# Patient Record
Sex: MALE | Race: WHITE | Employment: FULL TIME | URBAN - METROPOLITAN AREA
[De-identification: names, ages, dates, MRNs, and addresses within clinical notes are randomized per-mention and may not be internally consistent; named-entity substitution may affect disease eponyms.]

---

## 2017-05-10 ENCOUNTER — EMPLOYEE WELLNESS (OUTPATIENT)
Dept: OTHER | Age: 62
End: 2017-05-10

## 2017-05-10 LAB
CHOLESTEROL, TOTAL: 227 MG/DL (ref 0–199)
GLUCOSE BLD-MCNC: 104 MG/DL (ref 74–107)
HDLC SERPL-MCNC: 42 MG/DL
LDL CHOLESTEROL CALCULATED: 159 MG/DL (ref 0–99)
TRIGL SERPL-MCNC: 131 MG/DL (ref 0–149)

## 2018-03-20 VITALS — BODY MASS INDEX: 24.95 KG/M2 | WEIGHT: 184 LBS

## 2018-04-19 ENCOUNTER — EMPLOYEE WELLNESS (OUTPATIENT)
Dept: OTHER | Age: 63
End: 2018-04-19

## 2018-04-19 LAB
CHOLESTEROL, TOTAL: 212 MG/DL (ref 0–199)
GLUCOSE BLD-MCNC: 111 MG/DL (ref 74–107)
HDLC SERPL-MCNC: 47 MG/DL
LDL CHOLESTEROL CALCULATED: 136 MG/DL (ref 0–99)
TRIGL SERPL-MCNC: 143 MG/DL (ref 0–149)

## 2018-04-23 VITALS — WEIGHT: 183 LBS | BODY MASS INDEX: 22.28 KG/M2

## 2018-06-01 ENCOUNTER — HOSPITAL ENCOUNTER (OUTPATIENT)
Dept: INFUSION THERAPY | Age: 63
Discharge: HOME | End: 2018-06-01
Payer: COMMERCIAL

## 2018-06-01 DIAGNOSIS — C18.0 CANCER OF CECUM (HCC): ICD-10-CM

## 2018-06-01 LAB
ALBUMIN SERPL-MCNC: 4.5 G/DL (ref 3.5–5.2)
ALP BLD-CCNC: 65 U/L (ref 40–129)
ALT SERPL-CCNC: 25 U/L (ref 0–40)
ANION GAP SERPL CALCULATED.3IONS-SCNC: 14 MMOL/L (ref 7–16)
AST SERPL-CCNC: 29 U/L (ref 0–39)
BASOPHILS ABSOLUTE: 0.03 E9/L (ref 0–0.2)
BASOPHILS RELATIVE PERCENT: 0.4 % (ref 0–2)
BILIRUB SERPL-MCNC: 0.5 MG/DL (ref 0–1.2)
BUN BLDV-MCNC: 9 MG/DL (ref 8–23)
CALCIUM SERPL-MCNC: 9.1 MG/DL (ref 8.6–10.2)
CEA: 4 NG/ML (ref 0–5.2)
CHLORIDE BLD-SCNC: 102 MMOL/L (ref 98–107)
CO2: 23 MMOL/L (ref 22–29)
CREAT SERPL-MCNC: 0.6 MG/DL (ref 0.7–1.2)
EOSINOPHILS ABSOLUTE: 0.17 E9/L (ref 0.05–0.5)
EOSINOPHILS RELATIVE PERCENT: 2.5 % (ref 0–6)
GFR AFRICAN AMERICAN: >60
GFR NON-AFRICAN AMERICAN: >60 ML/MIN/1.73
GLUCOSE BLD-MCNC: 115 MG/DL (ref 74–109)
HCT VFR BLD CALC: 43.2 % (ref 37–54)
HEMOGLOBIN: 14.3 G/DL (ref 12.5–16.5)
IMMATURE GRANULOCYTES #: 0.03 E9/L
IMMATURE GRANULOCYTES %: 0.4 % (ref 0–5)
LYMPHOCYTES ABSOLUTE: 2.15 E9/L (ref 1.5–4)
LYMPHOCYTES RELATIVE PERCENT: 31.9 % (ref 20–42)
MCH RBC QN AUTO: 30.8 PG (ref 26–35)
MCHC RBC AUTO-ENTMCNC: 33.1 % (ref 32–34.5)
MCV RBC AUTO: 92.9 FL (ref 80–99.9)
MONOCYTES ABSOLUTE: 0.57 E9/L (ref 0.1–0.95)
MONOCYTES RELATIVE PERCENT: 8.5 % (ref 2–12)
NEUTROPHILS ABSOLUTE: 3.79 E9/L (ref 1.8–7.3)
NEUTROPHILS RELATIVE PERCENT: 56.3 % (ref 43–80)
PDW BLD-RTO: 12.6 FL (ref 11.5–15)
PLATELET # BLD: 232 E9/L (ref 130–450)
PMV BLD AUTO: 9.2 FL (ref 7–12)
POTASSIUM SERPL-SCNC: 4.5 MMOL/L (ref 3.5–5)
RBC # BLD: 4.65 E12/L (ref 3.8–5.8)
SODIUM BLD-SCNC: 139 MMOL/L (ref 132–146)
TOTAL PROTEIN: 7.3 G/DL (ref 6.4–8.3)
WBC # BLD: 6.7 E9/L (ref 4.5–11.5)

## 2018-06-01 PROCEDURE — 85025 COMPLETE CBC W/AUTO DIFF WBC: CPT

## 2018-06-01 PROCEDURE — 82378 CARCINOEMBRYONIC ANTIGEN: CPT

## 2018-06-01 PROCEDURE — 36415 COLL VENOUS BLD VENIPUNCTURE: CPT

## 2018-06-01 PROCEDURE — 80053 COMPREHEN METABOLIC PANEL: CPT

## 2018-06-14 ENCOUNTER — HOSPITAL ENCOUNTER (OUTPATIENT)
Dept: CT IMAGING | Age: 63
Discharge: HOME | End: 2018-06-14
Payer: COMMERCIAL

## 2018-06-14 DIAGNOSIS — C18.0 CANCER OF CECUM (HCC): ICD-10-CM

## 2018-06-14 PROCEDURE — 74178 CT ABD&PLV WO CNTR FLWD CNTR: CPT

## 2018-06-14 PROCEDURE — 6360000004 HC RX CONTRAST MEDICATION: Performed by: RADIOLOGY

## 2018-06-14 PROCEDURE — 74177 CT ABD & PELVIS W/CONTRAST: CPT

## 2018-06-14 RX ADMIN — IOPAMIDOL 80 ML: 755 INJECTION, SOLUTION INTRAVENOUS at 10:14

## 2018-06-14 RX ADMIN — IOHEXOL 50 ML: 240 INJECTION, SOLUTION INTRATHECAL; INTRAVASCULAR; INTRAVENOUS; ORAL at 10:13

## 2018-06-18 ENCOUNTER — HOSPITAL ENCOUNTER (OUTPATIENT)
Dept: INFUSION THERAPY | Age: 63
Discharge: HOME | End: 2018-06-18
Payer: COMMERCIAL

## 2018-06-18 ENCOUNTER — OFFICE VISIT (OUTPATIENT)
Dept: ONCOLOGY | Age: 63
End: 2018-06-18
Payer: COMMERCIAL

## 2018-06-18 VITALS
TEMPERATURE: 97.6 F | DIASTOLIC BLOOD PRESSURE: 76 MMHG | SYSTOLIC BLOOD PRESSURE: 120 MMHG | WEIGHT: 184.63 LBS | HEIGHT: 76 IN | RESPIRATION RATE: 20 BRPM | HEART RATE: 95 BPM | BODY MASS INDEX: 22.48 KG/M2

## 2018-06-18 DIAGNOSIS — C18.0 CANCER OF CECUM (HCC): Primary | ICD-10-CM

## 2018-06-18 PROCEDURE — 99212 OFFICE O/P EST SF 10 MIN: CPT

## 2018-12-17 ENCOUNTER — OFFICE VISIT (OUTPATIENT)
Dept: ONCOLOGY | Age: 63
End: 2018-12-17
Payer: COMMERCIAL

## 2018-12-17 ENCOUNTER — HOSPITAL ENCOUNTER (OUTPATIENT)
Dept: INFUSION THERAPY | Age: 63
Discharge: HOME | End: 2018-12-17
Payer: COMMERCIAL

## 2018-12-17 VITALS
HEART RATE: 75 BPM | SYSTOLIC BLOOD PRESSURE: 109 MMHG | DIASTOLIC BLOOD PRESSURE: 79 MMHG | TEMPERATURE: 98.3 F | HEIGHT: 76 IN | BODY MASS INDEX: 23.67 KG/M2 | WEIGHT: 194.4 LBS | RESPIRATION RATE: 20 BRPM

## 2018-12-17 DIAGNOSIS — C18.0 CANCER OF CECUM (HCC): Primary | ICD-10-CM

## 2018-12-17 DIAGNOSIS — C18.0 CARCINOMA OF CECUM (HCC): ICD-10-CM

## 2018-12-17 DIAGNOSIS — C18.0 CANCER OF CECUM (HCC): ICD-10-CM

## 2018-12-17 LAB
ALBUMIN SERPL-MCNC: 4.3 G/DL (ref 3.5–5.2)
ALP BLD-CCNC: 68 U/L (ref 40–129)
ALT SERPL-CCNC: 26 U/L (ref 0–40)
ANION GAP SERPL CALCULATED.3IONS-SCNC: 11 MMOL/L (ref 7–16)
AST SERPL-CCNC: 32 U/L (ref 0–39)
BASOPHILS ABSOLUTE: 0.05 E9/L (ref 0–0.2)
BASOPHILS RELATIVE PERCENT: 0.8 % (ref 0–2)
BILIRUB SERPL-MCNC: 0.3 MG/DL (ref 0–1.2)
BUN BLDV-MCNC: 10 MG/DL (ref 8–23)
CALCIUM SERPL-MCNC: 8.9 MG/DL (ref 8.6–10.2)
CEA: 4.3 NG/ML (ref 0–5.2)
CHLORIDE BLD-SCNC: 102 MMOL/L (ref 98–107)
CO2: 23 MMOL/L (ref 22–29)
CREAT SERPL-MCNC: 0.6 MG/DL (ref 0.7–1.2)
EOSINOPHILS ABSOLUTE: 0.21 E9/L (ref 0.05–0.5)
EOSINOPHILS RELATIVE PERCENT: 3.5 % (ref 0–6)
GFR AFRICAN AMERICAN: >60
GFR NON-AFRICAN AMERICAN: >60 ML/MIN/1.73
GLUCOSE BLD-MCNC: 114 MG/DL (ref 74–99)
HCT VFR BLD CALC: 43.9 % (ref 37–54)
HEMOGLOBIN: 14.7 G/DL (ref 12.5–16.5)
IMMATURE GRANULOCYTES #: 0.01 E9/L
IMMATURE GRANULOCYTES %: 0.2 % (ref 0–5)
LYMPHOCYTES ABSOLUTE: 1.88 E9/L (ref 1.5–4)
LYMPHOCYTES RELATIVE PERCENT: 31.3 % (ref 20–42)
MCH RBC QN AUTO: 30.5 PG (ref 26–35)
MCHC RBC AUTO-ENTMCNC: 33.5 % (ref 32–34.5)
MCV RBC AUTO: 91.1 FL (ref 80–99.9)
MONOCYTES ABSOLUTE: 0.5 E9/L (ref 0.1–0.95)
MONOCYTES RELATIVE PERCENT: 8.3 % (ref 2–12)
NEUTROPHILS ABSOLUTE: 3.35 E9/L (ref 1.8–7.3)
NEUTROPHILS RELATIVE PERCENT: 55.9 % (ref 43–80)
PDW BLD-RTO: 12.6 FL (ref 11.5–15)
PLATELET # BLD: 183 E9/L (ref 130–450)
PMV BLD AUTO: 9 FL (ref 7–12)
POTASSIUM SERPL-SCNC: 4.6 MMOL/L (ref 3.5–5)
RBC # BLD: 4.82 E12/L (ref 3.8–5.8)
SODIUM BLD-SCNC: 136 MMOL/L (ref 132–146)
TOTAL PROTEIN: 7.2 G/DL (ref 6.4–8.3)
WBC # BLD: 6 E9/L (ref 4.5–11.5)

## 2018-12-17 PROCEDURE — 85025 COMPLETE CBC W/AUTO DIFF WBC: CPT

## 2018-12-17 PROCEDURE — 99212 OFFICE O/P EST SF 10 MIN: CPT

## 2018-12-17 PROCEDURE — 82378 CARCINOEMBRYONIC ANTIGEN: CPT

## 2018-12-17 PROCEDURE — 36415 COLL VENOUS BLD VENIPUNCTURE: CPT

## 2018-12-17 PROCEDURE — 80053 COMPREHEN METABOLIC PANEL: CPT

## 2019-04-16 ENCOUNTER — EMPLOYEE WELLNESS (OUTPATIENT)
Dept: OTHER | Age: 64
End: 2019-04-16

## 2019-04-16 LAB
CHOLESTEROL, TOTAL: 191 MG/DL (ref 0–199)
GLUCOSE BLD-MCNC: 106 MG/DL (ref 74–107)
HDLC SERPL-MCNC: 41 MG/DL
LDL CHOLESTEROL CALCULATED: 118 MG/DL (ref 0–99)
TRIGL SERPL-MCNC: 161 MG/DL (ref 0–149)

## 2019-04-23 VITALS — WEIGHT: 184 LBS | BODY MASS INDEX: 22.4 KG/M2

## 2019-07-03 ENCOUNTER — OFFICE VISIT (OUTPATIENT)
Dept: ONCOLOGY | Age: 64
End: 2019-07-03
Payer: COMMERCIAL

## 2019-07-03 ENCOUNTER — HOSPITAL ENCOUNTER (OUTPATIENT)
Dept: INFUSION THERAPY | Age: 64
Discharge: HOME | End: 2019-07-03
Payer: COMMERCIAL

## 2019-07-03 VITALS
HEIGHT: 76 IN | TEMPERATURE: 98.5 F | DIASTOLIC BLOOD PRESSURE: 75 MMHG | SYSTOLIC BLOOD PRESSURE: 120 MMHG | HEART RATE: 83 BPM | RESPIRATION RATE: 20 BRPM | WEIGHT: 183.4 LBS | BODY MASS INDEX: 22.33 KG/M2

## 2019-07-03 DIAGNOSIS — C18.0 CARCINOMA OF CECUM (HCC): ICD-10-CM

## 2019-07-03 DIAGNOSIS — C18.9 COLON CANCER METASTASIZED TO INTRA-ABDOMINAL LYMPH NODE (HCC): Primary | ICD-10-CM

## 2019-07-03 DIAGNOSIS — C77.2 COLON CANCER METASTASIZED TO INTRA-ABDOMINAL LYMPH NODE (HCC): Primary | ICD-10-CM

## 2019-07-03 LAB
ALBUMIN SERPL-MCNC: 4.4 G/DL (ref 3.5–5.2)
ALP BLD-CCNC: 56 U/L (ref 40–129)
ALT SERPL-CCNC: 19 U/L (ref 0–40)
ANION GAP SERPL CALCULATED.3IONS-SCNC: 11 MMOL/L (ref 7–16)
AST SERPL-CCNC: 22 U/L (ref 0–39)
BASOPHILS ABSOLUTE: 0.03 E9/L (ref 0–0.2)
BASOPHILS RELATIVE PERCENT: 0.6 % (ref 0–2)
BILIRUB SERPL-MCNC: 0.5 MG/DL (ref 0–1.2)
BUN BLDV-MCNC: 10 MG/DL (ref 8–23)
CALCIUM SERPL-MCNC: 8.9 MG/DL (ref 8.6–10.2)
CEA: 2.9 NG/ML (ref 0–5.2)
CHLORIDE BLD-SCNC: 106 MMOL/L (ref 98–107)
CO2: 24 MMOL/L (ref 22–29)
CREAT SERPL-MCNC: 0.7 MG/DL (ref 0.7–1.2)
EOSINOPHILS ABSOLUTE: 0.09 E9/L (ref 0.05–0.5)
EOSINOPHILS RELATIVE PERCENT: 1.8 % (ref 0–6)
GFR AFRICAN AMERICAN: >60
GFR NON-AFRICAN AMERICAN: >60 ML/MIN/1.73
GLUCOSE BLD-MCNC: 145 MG/DL (ref 74–99)
HCT VFR BLD CALC: 41 % (ref 37–54)
HEMOGLOBIN: 13.7 G/DL (ref 12.5–16.5)
IMMATURE GRANULOCYTES #: 0.02 E9/L
IMMATURE GRANULOCYTES %: 0.4 % (ref 0–5)
LYMPHOCYTES ABSOLUTE: 1.43 E9/L (ref 1.5–4)
LYMPHOCYTES RELATIVE PERCENT: 28.3 % (ref 20–42)
MCH RBC QN AUTO: 31.1 PG (ref 26–35)
MCHC RBC AUTO-ENTMCNC: 33.4 % (ref 32–34.5)
MCV RBC AUTO: 93 FL (ref 80–99.9)
MONOCYTES ABSOLUTE: 0.47 E9/L (ref 0.1–0.95)
MONOCYTES RELATIVE PERCENT: 9.3 % (ref 2–12)
NEUTROPHILS ABSOLUTE: 3.01 E9/L (ref 1.8–7.3)
NEUTROPHILS RELATIVE PERCENT: 59.6 % (ref 43–80)
PDW BLD-RTO: 12.6 FL (ref 11.5–15)
PLATELET # BLD: 186 E9/L (ref 130–450)
PMV BLD AUTO: 9.4 FL (ref 7–12)
POTASSIUM SERPL-SCNC: 3.9 MMOL/L (ref 3.5–5)
RBC # BLD: 4.41 E12/L (ref 3.8–5.8)
SODIUM BLD-SCNC: 141 MMOL/L (ref 132–146)
TOTAL PROTEIN: 6.9 G/DL (ref 6.4–8.3)
WBC # BLD: 5.1 E9/L (ref 4.5–11.5)

## 2019-07-03 PROCEDURE — 85025 COMPLETE CBC W/AUTO DIFF WBC: CPT

## 2019-07-03 PROCEDURE — 80053 COMPREHEN METABOLIC PANEL: CPT

## 2019-07-03 PROCEDURE — 99212 OFFICE O/P EST SF 10 MIN: CPT

## 2019-07-03 PROCEDURE — 36415 COLL VENOUS BLD VENIPUNCTURE: CPT

## 2019-07-03 PROCEDURE — 82378 CARCINOEMBRYONIC ANTIGEN: CPT

## 2020-02-26 ENCOUNTER — HOSPITAL ENCOUNTER (OUTPATIENT)
Dept: INFUSION THERAPY | Age: 65
Discharge: HOME | End: 2020-02-26
Payer: COMMERCIAL

## 2020-02-26 ENCOUNTER — OFFICE VISIT (OUTPATIENT)
Dept: ONCOLOGY | Age: 65
End: 2020-02-26
Payer: COMMERCIAL

## 2020-02-26 VITALS
HEIGHT: 76 IN | DIASTOLIC BLOOD PRESSURE: 68 MMHG | OXYGEN SATURATION: 96 % | TEMPERATURE: 97.5 F | SYSTOLIC BLOOD PRESSURE: 131 MMHG | WEIGHT: 185.5 LBS | HEART RATE: 77 BPM | BODY MASS INDEX: 22.59 KG/M2

## 2020-02-26 DIAGNOSIS — C77.2 COLON CANCER METASTASIZED TO INTRA-ABDOMINAL LYMPH NODE (HCC): ICD-10-CM

## 2020-02-26 DIAGNOSIS — C18.9 COLON CANCER METASTASIZED TO INTRA-ABDOMINAL LYMPH NODE (HCC): ICD-10-CM

## 2020-02-26 LAB
ALBUMIN SERPL-MCNC: 4.5 G/DL (ref 3.5–5.2)
ALP BLD-CCNC: 72 U/L (ref 40–129)
ALT SERPL-CCNC: 17 U/L (ref 0–40)
ANION GAP SERPL CALCULATED.3IONS-SCNC: 14 MMOL/L (ref 7–16)
AST SERPL-CCNC: 23 U/L (ref 0–39)
BASOPHILS ABSOLUTE: 0.03 E9/L (ref 0–0.2)
BASOPHILS RELATIVE PERCENT: 0.5 % (ref 0–2)
BILIRUB SERPL-MCNC: 0.8 MG/DL (ref 0–1.2)
BUN BLDV-MCNC: 12 MG/DL (ref 8–23)
CALCIUM SERPL-MCNC: 9.5 MG/DL (ref 8.6–10.2)
CEA: 3.4 NG/ML (ref 0–5.2)
CHLORIDE BLD-SCNC: 100 MMOL/L (ref 98–107)
CO2: 24 MMOL/L (ref 22–29)
CREAT SERPL-MCNC: 0.7 MG/DL (ref 0.7–1.2)
EOSINOPHILS ABSOLUTE: 0.11 E9/L (ref 0.05–0.5)
EOSINOPHILS RELATIVE PERCENT: 2 % (ref 0–6)
GFR AFRICAN AMERICAN: >60
GFR NON-AFRICAN AMERICAN: >60 ML/MIN/1.73
GLUCOSE BLD-MCNC: 113 MG/DL (ref 74–99)
HCT VFR BLD CALC: 41.6 % (ref 37–54)
HEMOGLOBIN: 14.1 G/DL (ref 12.5–16.5)
IMMATURE GRANULOCYTES #: 0.01 E9/L
IMMATURE GRANULOCYTES %: 0.2 % (ref 0–5)
LYMPHOCYTES ABSOLUTE: 1.66 E9/L (ref 1.5–4)
LYMPHOCYTES RELATIVE PERCENT: 29.9 % (ref 20–42)
MCH RBC QN AUTO: 31.1 PG (ref 26–35)
MCHC RBC AUTO-ENTMCNC: 33.9 % (ref 32–34.5)
MCV RBC AUTO: 91.6 FL (ref 80–99.9)
MONOCYTES ABSOLUTE: 0.51 E9/L (ref 0.1–0.95)
MONOCYTES RELATIVE PERCENT: 9.2 % (ref 2–12)
NEUTROPHILS ABSOLUTE: 3.23 E9/L (ref 1.8–7.3)
NEUTROPHILS RELATIVE PERCENT: 58.2 % (ref 43–80)
PDW BLD-RTO: 13.1 FL (ref 11.5–15)
PLATELET # BLD: 218 E9/L (ref 130–450)
PMV BLD AUTO: 9.1 FL (ref 7–12)
POTASSIUM SERPL-SCNC: 3.9 MMOL/L (ref 3.5–5)
RBC # BLD: 4.54 E12/L (ref 3.8–5.8)
SODIUM BLD-SCNC: 138 MMOL/L (ref 132–146)
TOTAL PROTEIN: 7.5 G/DL (ref 6.4–8.3)
WBC # BLD: 5.6 E9/L (ref 4.5–11.5)

## 2020-02-26 PROCEDURE — 99212 OFFICE O/P EST SF 10 MIN: CPT

## 2020-02-26 PROCEDURE — 82378 CARCINOEMBRYONIC ANTIGEN: CPT

## 2020-02-26 PROCEDURE — 36415 COLL VENOUS BLD VENIPUNCTURE: CPT

## 2020-02-26 PROCEDURE — 85025 COMPLETE CBC W/AUTO DIFF WBC: CPT

## 2020-02-26 PROCEDURE — 80053 COMPREHEN METABOLIC PANEL: CPT

## 2020-02-26 NOTE — PROGRESS NOTES
[facility]. [place]        Pt Name: Joshua Duncan  YOB: 1955  Date of evaluation: 2/26/2020  Primary Care Physician: Srinivasan Gonzalez MD  Reason for evaluation:   Chief Complaint   Patient presents with    Colon Cancer     Cancer of the cecum. Metastasized to intra-abdominal lymph nodes    6 Month Follow-Up        Subjective: Here for  follow-up. Feels well today. Continues to work full-time. No c/o's        OBJECTIVE:  VITALS:  height is 6' 4\" (1.93 m) and weight is 185 lb 8 oz (84.1 kg). His temporal temperature is 97.5 °F (36.4 °C). His blood pressure is 131/68 and his pulse is 77. His oxygen saturation is 96%. Physical Exam:  Performance Status: 0  Well developed, well nourished male  EYES: sclera non-icteric   ENT: oropharynx clear. NECK: No lymphadenopathy. HEART: Regular rate and rhythm. LUNGS: Clear . ABDOMEN: Soft, nontender. No ascites. No mass or organomegaly. EXTREMITIES: without clubbing, cyanosis, or edema. NEUROLOGIC: No focal deficits. SKIN: No Rash      Medications  Prior to Admission medications    Medication Sig Start Date End Date Taking? Authorizing Provider   Timolol Maleate (ISTALOL) 0.5 % (DAILY) SOLN Apply 1 drop to eye daily.  Both eyes   Yes Historical Provider, MD    Scheduled Meds:  Continuous Infusions:  PRN Meds:.        Recent Laboratory Data-     Lab Results   Component Value Date    WBC 5.6 02/26/2020    HGB 14.1 02/26/2020    HCT 41.6 02/26/2020    MCV 91.6 02/26/2020     02/26/2020    LYMPHOPCT 29.9 02/26/2020    RBC 4.54 02/26/2020    MCH 31.1 02/26/2020    MCHC 33.9 02/26/2020    RDW 13.1 02/26/2020    NEUTOPHILPCT 58.2 02/26/2020    MONOPCT 9.2 02/26/2020    BASOPCT 0.5 02/26/2020    NEUTROABS 3.23 02/26/2020    LYMPHSABS 1.66 02/26/2020    MONOSABS 0.51 02/26/2020    EOSABS 0.11 02/26/2020    BASOSABS 0.03 02/26/2020       Lab Results   Component Value Date     02/26/2020    K 3.9 02/26/2020  02/26/2020    CO2 24 02/26/2020    BUN 12 02/26/2020    CREATININE 0.7 02/26/2020    GLUCOSE 113 (H) 02/26/2020    CALCIUM 9.5 02/26/2020    PROT 7.5 02/26/2020    LABALBU 4.5 02/26/2020    BILITOT 0.8 02/26/2020    ALKPHOS 72 02/26/2020    AST 23 02/26/2020    ALT 17 02/26/2020    LABGLOM >60 02/26/2020    GFRAA >60 02/26/2020         Lab Results   Component Value Date    IRON 53 (L) 05/06/2013    TIBC 350 05/06/2013    FERRITIN 54.1 05/06/2013             Lab Results   Component Value Date    CEA 3.4 02/26/2020           Radiology-  No results found. ASSESSMENT/PLAN :  3 A 72-year-old man with Hx of colon ca involving cecum S/P laparoscopic right hemicolectomy with clear margins on 4/17/2013 . 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 2-  Iron deficiency anemia resolved. 3-  Mild elevation in Transaminases related to Pravastatin resolved          PLAN:  CT scan of Chest, Abdomen and Pelvis in May 2016 were negative without evidence of disease recurrence . Follow up colonoscopy done by Dr. Tashi Castellanos 12/11/14 with no significant findings. He is doing well without evidence of disease recurrence. To continue surveillance  His follow-up CT scan of  abdomen and pelvis in April 2018 was essentially negative without any evidence of disease recurrence and his  follow-up colonoscopy in December 2017 By Dr. Sandra Downs was unremarkable.     He follows with Dr. Romayne Broaden his PCP for his hyperlipidemia. Recent lipid profile was unremarkable  He no longer requires any follow-up scans and his next colonoscopy will be scheduled in December 2020. .. He has borderline diabetes and will follow with Dr. Rashel Graf and will go on a diet    Humera Jenifer Romero M.D., F.A.C.P.   Electronically signed 2/26/2020 at 3:55 PM

## 2020-09-08 ENCOUNTER — EMPLOYEE WELLNESS (OUTPATIENT)
Dept: OTHER | Age: 65
End: 2020-09-08

## 2020-09-08 LAB
CHOLESTEROL, TOTAL: 214 MG/DL (ref 0–199)
GLUCOSE BLD-MCNC: 112 MG/DL (ref 74–107)
HDLC SERPL-MCNC: 49 MG/DL
LDL CHOLESTEROL CALCULATED: 142 MG/DL (ref 0–99)
TRIGL SERPL-MCNC: 113 MG/DL (ref 0–149)

## 2020-10-19 VITALS — BODY MASS INDEX: 21.91 KG/M2 | WEIGHT: 180 LBS

## 2020-12-07 ENCOUNTER — HOSPITAL ENCOUNTER (OUTPATIENT)
Dept: INFUSION THERAPY | Age: 65
Discharge: HOME | End: 2020-12-07
Payer: COMMERCIAL

## 2020-12-07 ENCOUNTER — OFFICE VISIT (OUTPATIENT)
Dept: ONCOLOGY | Age: 65
End: 2020-12-07
Payer: COMMERCIAL

## 2020-12-07 VITALS
OXYGEN SATURATION: 96 % | WEIGHT: 185 LBS | HEART RATE: 75 BPM | HEIGHT: 72 IN | SYSTOLIC BLOOD PRESSURE: 149 MMHG | DIASTOLIC BLOOD PRESSURE: 83 MMHG | TEMPERATURE: 95.2 F | BODY MASS INDEX: 25.06 KG/M2

## 2020-12-07 DIAGNOSIS — C18.9 COLON CANCER METASTASIZED TO INTRA-ABDOMINAL LYMPH NODE (HCC): ICD-10-CM

## 2020-12-07 DIAGNOSIS — C77.2 COLON CANCER METASTASIZED TO INTRA-ABDOMINAL LYMPH NODE (HCC): ICD-10-CM

## 2020-12-07 LAB
ALBUMIN SERPL-MCNC: 4.3 G/DL (ref 3.5–5.2)
ALP BLD-CCNC: 68 U/L (ref 40–129)
ALT SERPL-CCNC: 20 U/L (ref 0–40)
ANION GAP SERPL CALCULATED.3IONS-SCNC: 11 MMOL/L (ref 7–16)
AST SERPL-CCNC: 22 U/L (ref 0–39)
BASOPHILS ABSOLUTE: 0.03 E9/L (ref 0–0.2)
BASOPHILS RELATIVE PERCENT: 0.5 % (ref 0–2)
BILIRUB SERPL-MCNC: 0.6 MG/DL (ref 0–1.2)
BUN BLDV-MCNC: 18 MG/DL (ref 8–23)
CALCIUM SERPL-MCNC: 9.3 MG/DL (ref 8.6–10.2)
CEA: 4.1 NG/ML (ref 0–5.2)
CHLORIDE BLD-SCNC: 100 MMOL/L (ref 98–107)
CO2: 26 MMOL/L (ref 22–29)
CREAT SERPL-MCNC: 0.8 MG/DL (ref 0.7–1.2)
EOSINOPHILS ABSOLUTE: 0.16 E9/L (ref 0.05–0.5)
EOSINOPHILS RELATIVE PERCENT: 2.5 % (ref 0–6)
GFR AFRICAN AMERICAN: >60
GFR NON-AFRICAN AMERICAN: >60 ML/MIN/1.73
GLUCOSE BLD-MCNC: 111 MG/DL (ref 74–99)
HCT VFR BLD CALC: 45.9 % (ref 37–54)
HEMOGLOBIN: 15 G/DL (ref 12.5–16.5)
IMMATURE GRANULOCYTES #: 0.02 E9/L
IMMATURE GRANULOCYTES %: 0.3 % (ref 0–5)
LYMPHOCYTES ABSOLUTE: 1.56 E9/L (ref 1.5–4)
LYMPHOCYTES RELATIVE PERCENT: 24 % (ref 20–42)
MCH RBC QN AUTO: 30.9 PG (ref 26–35)
MCHC RBC AUTO-ENTMCNC: 32.7 % (ref 32–34.5)
MCV RBC AUTO: 94.6 FL (ref 80–99.9)
MONOCYTES ABSOLUTE: 0.46 E9/L (ref 0.1–0.95)
MONOCYTES RELATIVE PERCENT: 7.1 % (ref 2–12)
NEUTROPHILS ABSOLUTE: 4.27 E9/L (ref 1.8–7.3)
NEUTROPHILS RELATIVE PERCENT: 65.6 % (ref 43–80)
PDW BLD-RTO: 12.5 FL (ref 11.5–15)
PLATELET # BLD: 195 E9/L (ref 130–450)
PMV BLD AUTO: 9.2 FL (ref 7–12)
POTASSIUM SERPL-SCNC: 3.9 MMOL/L (ref 3.5–5)
RBC # BLD: 4.85 E12/L (ref 3.8–5.8)
SODIUM BLD-SCNC: 137 MMOL/L (ref 132–146)
TOTAL PROTEIN: 7.4 G/DL (ref 6.4–8.3)
WBC # BLD: 6.5 E9/L (ref 4.5–11.5)

## 2020-12-07 PROCEDURE — 80053 COMPREHEN METABOLIC PANEL: CPT

## 2020-12-07 PROCEDURE — 85025 COMPLETE CBC W/AUTO DIFF WBC: CPT

## 2020-12-07 PROCEDURE — 82378 CARCINOEMBRYONIC ANTIGEN: CPT

## 2020-12-07 PROCEDURE — 36415 COLL VENOUS BLD VENIPUNCTURE: CPT

## 2020-12-07 PROCEDURE — 99212 OFFICE O/P EST SF 10 MIN: CPT

## 2020-12-07 NOTE — PROGRESS NOTES
02/26/2020    CO2 24 02/26/2020    BUN 12 02/26/2020    CREATININE 0.7 02/26/2020    GLUCOSE 113 (H) 02/26/2020    CALCIUM 9.5 02/26/2020    PROT 7.5 02/26/2020    LABALBU 4.5 02/26/2020    BILITOT 0.8 02/26/2020    ALKPHOS 72 02/26/2020    AST 23 02/26/2020    ALT 17 02/26/2020    LABGLOM >60 02/26/2020    GFRAA >60 02/26/2020         Lab Results   Component Value Date    IRON 53 (L) 05/06/2013    TIBC 350 05/06/2013    FERRITIN 54.1 05/06/2013             Lab Results   Component Value Date    CEA 3.4 02/26/2020           Radiology-  No results found. ASSESSMENT/PLAN :  3 A 63-year-old man with Hx of colon ca involving cecum S/P laparoscopic right hemicolectomy with clear margins on 4/17/2013 . 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 2-  Iron deficiency anemia resolved. 3-  Mild elevation in Transaminases related to Pravastatin resolved          CT scan of Chest, Abdomen and Pelvis in May 2016 were negative without evidence of disease recurrence . Follow up colonoscopy done by Dr. Emily Prince 12/11/14 with no significant findings. He is doing well without evidence of disease recurrence. To continue surveillance  His follow-up CT scan of  abdomen and pelvis in April 2018 was essentially negative without any evidence of disease recurrence and his  follow-up colonoscopy in December 2017 By Dr. Danii Beasley was unremarkable.     He follows with Dr. Marely Owens his PCP for his hyperlipidemia. Recent lipid profile was unremarkable  He no longer requires any follow-up scans and his next colonoscopy will be scheduled in December 2020. .. He has borderline diabetes and will follow with Dr. Monico Nageotte and will go on a diet. PLAN:  12/07/2020        Lea Obrien. Jazmyn Segovia M.D., F.A.C.P.   Electronically signed 2/26/2020 at 3:55 PM

## 2020-12-07 NOTE — PROGRESS NOTES
[facility]. [facility]        Pt Name: Lor Osborne  YOB: 1955  Date of evaluation: 12/7/2020  Primary Care Physician: Lorin Mann MD  Reason for evaluation:   Chief Complaint   Patient presents with    Colon Cancer     Metastasis metastasized to intra-abdominal lymph node    Follow-up        Subjective: Here for  follow-up. Feels well today. Continues to work full-time. No c/o's        OBJECTIVE:  VITALS:  height is 6' 4\" (1.93 m) and weight is 185 lb 8 oz (84.1 kg). His temporal temperature is 97.5 °F (36.4 °C). His blood pressure is 131/68 and his pulse is 77. His oxygen saturation is 96%. Physical Exam:  Performance Status: 0  Well developed, well nourished male  EYES: sclera non-icteric   ENT: oropharynx clear. NECK: No lymphadenopathy. HEART: Regular rate and rhythm. LUNGS: Clear . ABDOMEN: Soft, nontender. No ascites. No mass or organomegaly. EXTREMITIES: without clubbing, cyanosis, or edema. NEUROLOGIC: No focal deficits. SKIN: No Rash        Medications  Prior to Admission medications    Medication Sig Start Date End Date Taking? Authorizing Provider   Timolol Maleate (ISTALOL) 0.5 % (DAILY) SOLN Apply 1 drop to eye daily.  Both eyes    Historical Provider, MD    Scheduled Meds:  Continuous Infusions:  PRN Meds:.        Recent Laboratory Data-     Lab Results   Component Value Date    WBC 6.5 12/07/2020    HGB 15.0 12/07/2020    HCT 45.9 12/07/2020    MCV 94.6 12/07/2020     12/07/2020    LYMPHOPCT 24.0 12/07/2020    RBC 4.85 12/07/2020    MCH 30.9 12/07/2020    MCHC 32.7 12/07/2020    RDW 12.5 12/07/2020    NEUTOPHILPCT 65.6 12/07/2020    MONOPCT 7.1 12/07/2020    BASOPCT 0.5 12/07/2020    NEUTROABS 4.27 12/07/2020    LYMPHSABS 1.56 12/07/2020    MONOSABS 0.46 12/07/2020    EOSABS 0.16 12/07/2020    BASOSABS 0.03 12/07/2020       Lab Results   Component Value Date     12/07/2020    K 3.9 12/07/2020     12/07/2020 CO2 26 12/07/2020    BUN 18 12/07/2020    CREATININE 0.8 12/07/2020    GLUCOSE 111 (H) 12/07/2020    CALCIUM 9.3 12/07/2020    PROT 7.4 12/07/2020    LABALBU 4.3 12/07/2020    BILITOT 0.6 12/07/2020    ALKPHOS 68 12/07/2020    AST 22 12/07/2020    ALT 20 12/07/2020    LABGLOM >60 12/07/2020    GFRAA >60 12/07/2020         Lab Results   Component Value Date    IRON 53 (L) 05/06/2013    TIBC 350 05/06/2013    FERRITIN 54.1 05/06/2013         No results found for:       Lab Results   Component Value Date    CEA 3.4 02/26/2020         Radiology-  No results found. ASSESSMENT/PLAN:  3 A 45-year-old man with Hx of colon ca involving cecum S/P laparoscopic right hemicolectomy with clear margins on 4/17/2013 . 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 2-  Iron deficiency anemia resolved. 3-  Mild elevation in Transaminases related to Pravastatin resolved          CT scan of Chest, Abdomen and Pelvis in May 2016 were negative without evidence of disease recurrence . Follow up colonoscopy done by Dr. Herberth Gamlbe 12/11/14 with no significant findings. He is doing well without evidence of disease recurrence. To continue surveillance  His follow-up CT scan of  abdomen and pelvis in April 2018 was essentially negative without any evidence of disease recurrence and his  follow-up colonoscopy in December 2017 By Dr. Buffy Salazar was unremarkable.     He follows with Dr. Anglin Blanche his PCP for his hyperlipidemia. Recent lipid profile was unremarkable  He no longer requires any follow-up scans and his next colonoscopy will be scheduled in December 2020. ..     He has borderline diabetes and will follow with Dr. Shyanne Moreno and will go on a diet      He also has borderline hyperlipidemia. He would like to postpone his colonoscopy till next year till the COVID-19 pandemic eases. Dianne Hernandez.  Niecy Bean M.D., MCKINLEY NATHAN   Electronically signed 12/7/2020 at 11:03 AM

## 2021-06-07 ENCOUNTER — HOSPITAL ENCOUNTER (OUTPATIENT)
Dept: INFUSION THERAPY | Age: 66
Discharge: HOME | End: 2021-06-07
Payer: COMMERCIAL

## 2021-06-07 ENCOUNTER — OFFICE VISIT (OUTPATIENT)
Dept: ONCOLOGY | Age: 66
End: 2021-06-07
Payer: COMMERCIAL

## 2021-06-07 VITALS
HEART RATE: 78 BPM | BODY MASS INDEX: 25.79 KG/M2 | TEMPERATURE: 97.5 F | WEIGHT: 190.4 LBS | SYSTOLIC BLOOD PRESSURE: 131 MMHG | OXYGEN SATURATION: 97 % | HEIGHT: 72 IN | DIASTOLIC BLOOD PRESSURE: 84 MMHG

## 2021-06-07 DIAGNOSIS — C77.2 COLON CANCER METASTASIZED TO INTRA-ABDOMINAL LYMPH NODE (HCC): ICD-10-CM

## 2021-06-07 DIAGNOSIS — C18.9 COLON CANCER METASTASIZED TO INTRA-ABDOMINAL LYMPH NODE (HCC): Primary | ICD-10-CM

## 2021-06-07 DIAGNOSIS — C18.9 COLON CANCER METASTASIZED TO INTRA-ABDOMINAL LYMPH NODE (HCC): ICD-10-CM

## 2021-06-07 DIAGNOSIS — C77.2 COLON CANCER METASTASIZED TO INTRA-ABDOMINAL LYMPH NODE (HCC): Primary | ICD-10-CM

## 2021-06-07 LAB
ALBUMIN SERPL-MCNC: 4.4 G/DL (ref 3.5–5.2)
ALP BLD-CCNC: 69 U/L (ref 40–129)
ALT SERPL-CCNC: 17 U/L (ref 0–40)
ANION GAP SERPL CALCULATED.3IONS-SCNC: 12 MMOL/L (ref 7–16)
AST SERPL-CCNC: 21 U/L (ref 0–39)
BASOPHILS ABSOLUTE: 0.04 E9/L (ref 0–0.2)
BASOPHILS RELATIVE PERCENT: 0.6 % (ref 0–2)
BILIRUB SERPL-MCNC: 0.7 MG/DL (ref 0–1.2)
BUN BLDV-MCNC: 10 MG/DL (ref 6–23)
CALCIUM SERPL-MCNC: 9.3 MG/DL (ref 8.6–10.2)
CEA: 4.1 NG/ML (ref 0–5.2)
CHLORIDE BLD-SCNC: 104 MMOL/L (ref 98–107)
CO2: 22 MMOL/L (ref 22–29)
CREAT SERPL-MCNC: 0.8 MG/DL (ref 0.7–1.2)
EOSINOPHILS ABSOLUTE: 0.15 E9/L (ref 0.05–0.5)
EOSINOPHILS RELATIVE PERCENT: 2.4 % (ref 0–6)
GFR AFRICAN AMERICAN: >60
GFR NON-AFRICAN AMERICAN: >60 ML/MIN/1.73
GLUCOSE BLD-MCNC: 94 MG/DL (ref 74–99)
HCT VFR BLD CALC: 44.1 % (ref 37–54)
HEMOGLOBIN: 14.7 G/DL (ref 12.5–16.5)
IMMATURE GRANULOCYTES #: 0.02 E9/L
IMMATURE GRANULOCYTES %: 0.3 % (ref 0–5)
LYMPHOCYTES ABSOLUTE: 1.78 E9/L (ref 1.5–4)
LYMPHOCYTES RELATIVE PERCENT: 28.6 % (ref 20–42)
MCH RBC QN AUTO: 30.7 PG (ref 26–35)
MCHC RBC AUTO-ENTMCNC: 33.3 % (ref 32–34.5)
MCV RBC AUTO: 92.1 FL (ref 80–99.9)
MONOCYTES ABSOLUTE: 0.55 E9/L (ref 0.1–0.95)
MONOCYTES RELATIVE PERCENT: 8.8 % (ref 2–12)
NEUTROPHILS ABSOLUTE: 3.69 E9/L (ref 1.8–7.3)
NEUTROPHILS RELATIVE PERCENT: 59.3 % (ref 43–80)
PDW BLD-RTO: 12.7 FL (ref 11.5–15)
PLATELET # BLD: 204 E9/L (ref 130–450)
PMV BLD AUTO: 9.3 FL (ref 7–12)
POTASSIUM SERPL-SCNC: 3.9 MMOL/L (ref 3.5–5)
RBC # BLD: 4.79 E12/L (ref 3.8–5.8)
SODIUM BLD-SCNC: 138 MMOL/L (ref 132–146)
TOTAL PROTEIN: 7 G/DL (ref 6.4–8.3)
WBC # BLD: 6.2 E9/L (ref 4.5–11.5)

## 2021-06-07 PROCEDURE — 36415 COLL VENOUS BLD VENIPUNCTURE: CPT

## 2021-06-07 PROCEDURE — 85025 COMPLETE CBC W/AUTO DIFF WBC: CPT

## 2021-06-07 PROCEDURE — 80053 COMPREHEN METABOLIC PANEL: CPT

## 2021-06-07 PROCEDURE — 99213 OFFICE O/P EST LOW 20 MIN: CPT

## 2021-06-07 PROCEDURE — 82378 CARCINOEMBRYONIC ANTIGEN: CPT

## 2021-06-07 NOTE — PROGRESS NOTES
12/07/2020    CREATININE 0.8 12/07/2020    GLUCOSE 111 (H) 12/07/2020    CALCIUM 9.3 12/07/2020    PROT 7.4 12/07/2020    LABALBU 4.3 12/07/2020    BILITOT 0.6 12/07/2020    ALKPHOS 68 12/07/2020    AST 22 12/07/2020    ALT 20 12/07/2020    LABGLOM >60 12/07/2020    GFRAA >60 12/07/2020         Lab Results   Component Value Date    IRON 53 (L) 05/06/2013    TIBC 350 05/06/2013    FERRITIN 54.1 05/06/2013         No results found for:       Lab Results   Component Value Date    CEA 4.1 12/07/2020         Radiology-  No results found. ASSESSMENT/PLAN:  3 A 62-year-old man with Hx of colon ca involving cecum S/P laparoscopic right hemicolectomy with clear margins on 4/17/2013 . 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 2-  Iron deficiency anemia resolved. 3-  Mild elevation in Transaminases related to Pravastatin resolved          CT scan of Chest, Abdomen and Pelvis in May 2016 were negative without evidence of disease recurrence . Follow up colonoscopy done by Dr. Reese Robertson 12/11/14 with no significant findings. He is doing well without evidence of disease recurrence. To continue surveillance  His follow-up CT scan of  abdomen and pelvis in April 2018 was essentially negative without any evidence of disease recurrence and his  follow-up colonoscopy in December 2017 By Dr. Amol Kaufman was unremarkable.     He follows with Dr. Jurado Odor his PCP for his hyperlipidemia. Recent lipid profile was unremarkable  He no longer requires any follow-up scans and his next colonoscopy will be scheduled in December 2020. ..     He has borderline diabetes and will follow with Dr. Peace Arias and will go on a diet      He also has borderline hyperlipidemia. He would like to postpone his colonoscopy till next year till the COVID-19 pandemic eases.       6/07/2021  Completed 2 nd dose of Covid vaccine  He has gained

## 2021-08-23 NOTE — H&P
[address]                              HISTORY AND PHYSICAL    PATIENT NAME: Caden Rizo                         :        1955  MED REC NO:   [id number]                            ROOM:  ACCOUNT NO:   [de-identified]                           ADMIT DATE: 2021  PROVIDER:     Anat Painter MD    CHIEF COMPLAINT:  Colorectal cancer, surgery status. HISTORY OF PRESENT ILLNESS:  The patient is a 72-year-old. Cancer of  the colon, status post right hemicolectomy. ALLERGIES:  LEVAQUIN. HABITS:  Denies. CURRENT MEDICATIONS:  Simvastatin. MEDICAL HISTORY:  Cancer of the colon, glaucoma. PAST SURGERY:  Right colectomy. PHYSICAL EXAMINATION:  GENERAL:  Alert and oriented. VITAL SIGNS:  /80, pulse 72, respirations 16, temperature 97.1. HEENT:  Oral cavity negative. NECK:  Negative. The peripheral lymph nodes are not palpable. LUNGS:  Clear to auscultation. HEART:  Sounds regular. ABDOMEN:  Soft. No palpable masses. RECTAL:  Negative. GENITALIA:  Deferred. EXTREMITIES:  Negative. NEUROLOGIC:  Oriented to time and space. No gross deficit. IMPRESSION:  Colorectal cancer, surgery status. PLAN:  Colonoscopy.         Rico Camara MD    D: 2021 8:57:44       T: 2021 9:00:40     [email or web address]  Job#: [id number]     Doc#: [id number]    CC:

## 2021-09-01 LAB
CHOLESTEROL, TOTAL: 225 MG/DL (ref 0–199)
GLUCOSE BLD-MCNC: 104 MG/DL (ref 74–107)
HDLC SERPL-MCNC: 42 MG/DL
LDL CHOLESTEROL CALCULATED: 138 MG/DL (ref 0–99)
TRIGL SERPL-MCNC: 223 MG/DL (ref 0–149)

## 2021-09-08 ENCOUNTER — ANESTHESIA EVENT (OUTPATIENT)
Dept: ENDOSCOPY | Age: 66
End: 2021-09-08
Payer: COMMERCIAL

## 2021-09-08 RX ORDER — ATORVASTATIN CALCIUM 10 MG/1
10 TABLET, FILM COATED ORAL DAILY
COMMUNITY

## 2021-09-08 NOTE — PROGRESS NOTES
[facility]                                                                                                                     PRE OP INSTRUCTIONS FOR  Scarlett Black        Date: 9/8/2021    Date and time of surgery: 9/9/21   Arrival Time: 0645    1. Do not eat or drink anything after 12 midnight prior to surgery. This includes no water, chewing gum, mints or ice chips. 2. Take the following pills with a small sip of water on the morning of Surgery: None      3. Diabetics may take evening dose of insulin but none after midnight. If you feel symptomatic or low blood sugar take 1-2 ounces of apple juice only. 4. Aspirin, Ibuprofen, Advil, Naproxen, Vitamin E and other Anti-inflammatory products should be stopped  before surgery  as directed by your physician. 5. Check with your Doctor regarding stopping Plavix, Coumadin, Lovenox, Fragmin or other blood thinners. 6. Do not smoke,use illicit drugs and do not drink any alcoholic beverages 24 hours prior to surgery. 7. You may brush your teeth and gargle the morning of surgery. DO NOT SWALLOW WATER    8. You MUST make arrangements for a responsible adult to take you home after your surgery. You will not be allowed to leave alone or drive yourself home. It is strongly suggested someone stay with you the first 24 hrs. Your surgery will be cancelled if you do not have a ride home. 9. A parent/legal guardian must accompany a child scheduled for surgery and plan to stay at the hospital until the child is discharged. Please do not bring other children with you. 10. Please wear simple, loose fitting clothing to the hospital.  Julia Him not bring valuables (money, credit cards, checkbooks, etc.) Do not wear any makeup (including no eye makeup) or nail polish on your fingers or toes. 11. DO NOT wear any jewelry or piercings on day of surgery. All body piercing jewelry must be removed. 12.  Shower the night before surgery with _X__Antibacterial soap ___Hibiclens. 15. Remember to bring Blood Bank bracelet to the hospital on the day of surgery. 14. If you have a Living Will and Durable Power of  for Healthcare, please bring in a copy. 15. If appropriate bring crutches, inspirex, etc... 12. Notify your Surgeon if you develop any illness between now and surgery time, cough, cold, fever, sore throat, nausea, vomiting, etc.  Please notify your surgeon if you experience dizziness, shortness of breath or blurred vision between now & the time of your surgery. 17. If you have ___dentures, they will be removed before going to the OR; we will provide you a container. If you wear ___contact lenses or _X__glasses, they will be removed; please bring a case for them. 18. To provide excellent care visitors will be limited to one in the room at any given time. 19. Please bring picture ID and insurance card. 20. Sleep apnea patients need to bring CPAP to hospital on day of surgery. 21. Visit our web site for additional information: [email or web address]    22. During flu season no children under the age of 15 are permitted in the hospital for the safety of all patients. 23. Other Come in through main lobby, go to information desk. Please call pre admission testing if you have any further questions.    [facility]     [address]

## 2021-09-08 NOTE — ANESTHESIA PRE PROCEDURE
Department of Anesthesiology  Preprocedure Note       Name:  Cathleen Seat   Age:  63 y.o.  :  1955                                          MRN:  [id number]         Date:  2021      Surgeon: Scout Hageror):  Bandar Agustin MD    Procedure: Procedure(s):  COLONOSCOPY    Medications prior to admission:   Prior to Admission medications    Medication Sig Start Date End Date Taking? Authorizing Provider   atorvastatin (LIPITOR) 10 MG tablet Take 10 mg by mouth daily   Yes Historical Provider, MD   Timolol Maleate (ISTALOL) 0.5 % (DAILY) SOLN Apply 1 drop to eye daily. Both eyes    Historical Provider, MD       Current medications:    No current facility-administered medications for this encounter. Current Outpatient Medications   Medication Sig Dispense Refill    atorvastatin (LIPITOR) 10 MG tablet Take 10 mg by mouth daily      Timolol Maleate (ISTALOL) 0.5 % (DAILY) SOLN Apply 1 drop to eye daily. Both eyes         Allergies:     Allergies   Allergen Reactions    Levofloxacin Other (See Comments)     Heart irregular    Simvastatin Rash     insomnia       Problem List:    Patient Active Problem List   Diagnosis Code    Cancer of cecum ([facility] [address] C18.0    Mitral valve prolapse I34.1    Iron deficiency anemia D50.9    Carcinoma of cecum ([address] C18.0       Past Medical History:        Diagnosis Date    Allergic rhinitis, cause unspecified     Apnea     Bronchitis     Cancer ([address]     colon    Esophageal reflux     Mitral valve disorders(424.0)     mvp    Mixed hyperlipidemia     Myalgia and myositis, unspecified     Other acute sinusitis     Pneumonia     Sleep apnea     Sprain of tibiofibular (joint) (ligament) superior, of knee     Unspecified glaucoma(365.9)        Past Surgical History:        Procedure Laterality Date    EYE SURGERY      left eye-cataract    FOOT SURGERY      both great toes    RIGHT COLECTOMY Right 2013    LAPAROSCOPIC    SINUS SURGERY          TESTICLE SURGERY      left- varicele       Social History:    Social History     Tobacco Use    Smoking status: Former Smoker     Years: 10.00     Quit date: 1987     Years since quittin.7    Smokeless tobacco: Former User   Substance Use Topics    Alcohol use: Yes     Comment: 1 drink a day: beeror glass of wine,                                 Counseling given: Not Answered      Vital Signs (Current):   Vitals:    21 0841   Weight: 192 lb (87.1 kg)   Height: 6' (1.829 m)                                              BP Readings from Last 3 Encounters:   21 131/84   20 (!) 149/83   20 131/68       NPO Status:                                                                                 BMI:   Wt Readings from Last 3 Encounters:   21 190 lb 6.4 oz (86.4 kg)   20 185 lb (83.9 kg)   20 180 lb (81.6 kg)     Body mass index is 26.04 kg/m². CBC:   Lab Results   Component Value Date    WBC 6.2 2021    RBC 4.79 2021    HGB 14.7 2021    HCT 44.1 2021    MCV 92.1 2021    RDW 12.7 2021     2021       CMP:   Lab Results   Component Value Date     2021    K 3.9 2021     2021    CO2 22 2021    BUN 10 2021    CREATININE 0.8 2021    GFRAA >60 2021    LABGLOM >60 2021    GLUCOSE 104 2021    GLUCOSE 112 02/15/2012    PROT 7.0 2021    CALCIUM 9.3 2021    BILITOT 0.7 2021    ALKPHOS 69 2021    AST 21 2021    ALT 17 2021       POC Tests: No results for input(s): POCGLU, POCNA, POCK, POCCL, POCBUN, POCHEMO, POCHCT in the last 72 hours.     Coags: No results found for: PROTIME, INR, APTT    HCG (If Applicable): No results found for: PREGTESTUR, PREGSERUM, HCG, HCGQUANT     ABGs: No results found for: PHART, PO2ART, DEZ8CBG, EGM6OOQ, BEART, Z3JVPRXS     Type & Screen (If Applicable):  No results found for: LABABO, LABRH    Drug/Infectious

## 2021-09-09 ENCOUNTER — HOSPITAL ENCOUNTER (OUTPATIENT)
Age: 66
Setting detail: OUTPATIENT SURGERY
Discharge: HOME | End: 2021-09-09
Attending: INTERNAL MEDICINE | Admitting: INTERNAL MEDICINE
Payer: COMMERCIAL

## 2021-09-09 ENCOUNTER — ANESTHESIA (OUTPATIENT)
Dept: ENDOSCOPY | Age: 66
End: 2021-09-09
Payer: COMMERCIAL

## 2021-09-09 VITALS — DIASTOLIC BLOOD PRESSURE: 68 MMHG | OXYGEN SATURATION: 97 % | SYSTOLIC BLOOD PRESSURE: 97 MMHG

## 2021-09-09 VITALS
WEIGHT: 190 LBS | OXYGEN SATURATION: 97 % | HEART RATE: 64 BPM | BODY MASS INDEX: 25.73 KG/M2 | RESPIRATION RATE: 16 BRPM | SYSTOLIC BLOOD PRESSURE: 123 MMHG | HEIGHT: 72 IN | DIASTOLIC BLOOD PRESSURE: 71 MMHG | TEMPERATURE: 97 F

## 2021-09-09 PROCEDURE — 2580000003 HC RX 258: Performed by: ANESTHESIOLOGY

## 2021-09-09 PROCEDURE — 6360000002 HC RX W HCPCS: Performed by: ANESTHESIOLOGIST ASSISTANT

## 2021-09-09 PROCEDURE — 7100000010 HC PHASE II RECOVERY - FIRST 15 MIN: Performed by: INTERNAL MEDICINE

## 2021-09-09 PROCEDURE — 7100000011 HC PHASE II RECOVERY - ADDTL 15 MIN: Performed by: INTERNAL MEDICINE

## 2021-09-09 PROCEDURE — 3700000000 HC ANESTHESIA ATTENDED CARE: Performed by: INTERNAL MEDICINE

## 2021-09-09 PROCEDURE — 2709999900 HC NON-CHARGEABLE SUPPLY: Performed by: INTERNAL MEDICINE

## 2021-09-09 PROCEDURE — 3609027000 HC COLONOSCOPY: Performed by: INTERNAL MEDICINE

## 2021-09-09 PROCEDURE — 3700000001 HC ADD 15 MINUTES (ANESTHESIA): Performed by: INTERNAL MEDICINE

## 2021-09-09 RX ORDER — PROPOFOL 10 MG/ML
INJECTION, EMULSION INTRAVENOUS PRN
Status: DISCONTINUED | OUTPATIENT
Start: 2021-09-09 | End: 2021-09-09 | Stop reason: SDUPTHER

## 2021-09-09 RX ORDER — SODIUM CHLORIDE, SODIUM LACTATE, POTASSIUM CHLORIDE, CALCIUM CHLORIDE 600; 310; 30; 20 MG/100ML; MG/100ML; MG/100ML; MG/100ML
INJECTION, SOLUTION INTRAVENOUS CONTINUOUS
Status: DISCONTINUED | OUTPATIENT
Start: 2021-09-09 | End: 2021-09-09 | Stop reason: HOSPADM

## 2021-09-09 RX ADMIN — SODIUM CHLORIDE, POTASSIUM CHLORIDE, SODIUM LACTATE AND CALCIUM CHLORIDE: 600; 310; 30; 20 INJECTION, SOLUTION INTRAVENOUS at 06:43

## 2021-09-09 RX ADMIN — PROPOFOL 300 MG: 10 INJECTION, EMULSION INTRAVENOUS at 07:37

## 2021-09-09 ASSESSMENT — PAIN - FUNCTIONAL ASSESSMENT: PAIN_FUNCTIONAL_ASSESSMENT: 0-10

## 2021-09-09 ASSESSMENT — PAIN SCALES - GENERAL: PAINLEVEL_OUTOF10: 0

## 2021-09-09 NOTE — OP NOTE
Operative Note      Patient: Zuleyka Valero  YOB: 1955  MRN: [id number]    Date of Procedure: 9/9/2021    Pre-Op Diagnosis: COLON CA    Post-Op Diagnosis: Normal colonoscopy with mild diverticulosis       Procedure(s):  COLONOSCOPY    Surgeon(s):  Milagros Gómez MD    Assistant:   Surgical Assistant: Abdoul Stoddard RN  Fellow: Mariusz Casey DO    Anesthesia: Monitor Anesthesia Care    Estimated Blood Loss (mL): Minimal    Complications: None    Specimens:   * No specimens in log *    Implants:  * No implants in log *      Drains: * No LDAs found *        Colonoscopy Note    Indication:   Personal history of colon cancer, history of colon polyp, status post right hemicolectomy    Sedation:  MAC    Estimated Blood Loss: 0cc     Endoscope was advanced through anus to surgical anastomosis ileocolonic anastomosis identified pictures taken      Preparation is good. Patient tolerated procedure well. Surgical anastomosis ileocolonic anastomosis is normal  Transverse colon is normal  Descending colon is normal  Sigmoid colon have few small widely scattered diverticula  Rectum direct views are normal  Retroflexion in rectum shows normal mucosa and dentate line      IMPRESSION AND PLAN:   1. Normal colonoscopy with minimal diverticulosis. 2.  Follow up as outpatient in office, call [phone] to schedule for appointment if needed. Repeat screening colonoscopy in 3 yrs, earlier if alarm symptoms (pain, bleeding, weight loss, diarrhea or sudden onset constipation) occur. Pt was seen and procedure was performed with Dr. Francesco Huggins  present for the entire procedure.     Mariusz Casey DO  GI Fellow   8:19 AM

## 2021-09-09 NOTE — ANESTHESIA POSTPROCEDURE EVALUATION
Department of Anesthesiology  Postprocedure Note    Patient: Hermes Rg  MRN: [id number]  YOB: 1955  Date of evaluation: 9/9/2021  Time:  10:56 AM     Procedure Summary     Date: 09/09/21 Room / Location: [address] [place] [address] / [address] [place] [address]    Anesthesia Start: 0730 Anesthesia Stop: 8260    Procedure: COLONOSCOPY (N/A ) Diagnosis: (COLON CA)    Surgeons: Cricket Albarran MD Responsible Provider: Amira Thomas MD    Anesthesia Type: MAC ASA Status: 3          Anesthesia Type: MAC    Gabi Phase I: Gabi Score: 10    Gabi Phase II: Gabi Score: 10    Last vitals: Reviewed and per EMR flowsheets.        Anesthesia Post Evaluation    Patient location during evaluation: PACU  Patient participation: complete - patient participated  Level of consciousness: awake  Pain score: 0  Airway patency: patent  Nausea & Vomiting: no nausea  Complications: no  Cardiovascular status: blood pressure returned to baseline  Respiratory status: acceptable  Hydration status: euvolemic

## 2021-09-09 NOTE — H&P
H&p reviewed. No changes. Blood pressure 122/77, pulse 78, temperature 97.1 °F (36.2 °C), temperature source Infrared, resp. rate 16, height 6' (1.829 m), weight 190 lb (86.2 kg), SpO2 95 %. The patient was counseled at length about the risks of meggan Covid-19 during their perioperative period and any recovery window from their procedure. The patient was made aware that meggan Covid-19  may worsen their prognosis for recovering from their procedure  and lend to a higher morbidity and/or mortality risk. All material risks, benefits, and reasonable alternatives including postponing the procedure were discussed. The patient does wish to proceed with the procedure at this time.

## 2021-12-06 ENCOUNTER — OFFICE VISIT (OUTPATIENT)
Dept: ONCOLOGY | Age: 66
End: 2021-12-06
Payer: COMMERCIAL

## 2021-12-06 ENCOUNTER — HOSPITAL ENCOUNTER (OUTPATIENT)
Dept: INFUSION THERAPY | Age: 66
Discharge: HOME | End: 2021-12-06
Payer: COMMERCIAL

## 2021-12-06 VITALS
DIASTOLIC BLOOD PRESSURE: 71 MMHG | HEIGHT: 72 IN | WEIGHT: 195.4 LBS | OXYGEN SATURATION: 96 % | HEART RATE: 99 BPM | TEMPERATURE: 98.2 F | BODY MASS INDEX: 26.47 KG/M2 | SYSTOLIC BLOOD PRESSURE: 116 MMHG

## 2021-12-06 DIAGNOSIS — C77.2 COLON CANCER METASTASIZED TO INTRA-ABDOMINAL LYMPH NODE (HCC): Primary | ICD-10-CM

## 2021-12-06 DIAGNOSIS — C18.9 COLON CANCER METASTASIZED TO INTRA-ABDOMINAL LYMPH NODE (HCC): Primary | ICD-10-CM

## 2021-12-06 DIAGNOSIS — C77.2 COLON CANCER METASTASIZED TO INTRA-ABDOMINAL LYMPH NODE (HCC): ICD-10-CM

## 2021-12-06 DIAGNOSIS — C18.9 COLON CANCER METASTASIZED TO INTRA-ABDOMINAL LYMPH NODE (HCC): ICD-10-CM

## 2021-12-06 LAB
BASOPHILS ABSOLUTE: 0.03 E9/L (ref 0–0.2)
BASOPHILS RELATIVE PERCENT: 0.4 % (ref 0–2)
CEA: 4 NG/ML (ref 0–5.2)
EOSINOPHILS ABSOLUTE: 0.18 E9/L (ref 0.05–0.5)
EOSINOPHILS RELATIVE PERCENT: 2.7 % (ref 0–6)
HCT VFR BLD CALC: 45.8 % (ref 37–54)
HEMOGLOBIN: 15.6 G/DL (ref 12.5–16.5)
IMMATURE GRANULOCYTES #: 0.04 E9/L
IMMATURE GRANULOCYTES %: 0.6 % (ref 0–5)
LYMPHOCYTES ABSOLUTE: 1.87 E9/L (ref 1.5–4)
LYMPHOCYTES RELATIVE PERCENT: 27.9 % (ref 20–42)
MCH RBC QN AUTO: 31.5 PG (ref 26–35)
MCHC RBC AUTO-ENTMCNC: 34.1 % (ref 32–34.5)
MCV RBC AUTO: 92.5 FL (ref 80–99.9)
MONOCYTES ABSOLUTE: 0.57 E9/L (ref 0.1–0.95)
MONOCYTES RELATIVE PERCENT: 8.5 % (ref 2–12)
NEUTROPHILS ABSOLUTE: 4.01 E9/L (ref 1.8–7.3)
NEUTROPHILS RELATIVE PERCENT: 59.9 % (ref 43–80)
PDW BLD-RTO: 12.7 FL (ref 11.5–15)
PLATELET # BLD: 199 E9/L (ref 130–450)
PMV BLD AUTO: 9.3 FL (ref 7–12)
RBC # BLD: 4.95 E12/L (ref 3.8–5.8)
WBC # BLD: 6.7 E9/L (ref 4.5–11.5)

## 2021-12-06 PROCEDURE — 82378 CARCINOEMBRYONIC ANTIGEN: CPT

## 2021-12-06 PROCEDURE — 36415 COLL VENOUS BLD VENIPUNCTURE: CPT

## 2021-12-06 PROCEDURE — 99212 OFFICE O/P EST SF 10 MIN: CPT

## 2021-12-06 PROCEDURE — 85025 COMPLETE CBC W/AUTO DIFF WBC: CPT

## 2021-12-06 NOTE — PROGRESS NOTES
[address] [facility]. [facility] [place]        Pt Name: Melony Tatum  YOB: 1955  Date of evaluation: 12/6/2021  Primary Care Physician: Moni Dwyer MD  Reason for evaluation:   Chief Complaint   Patient presents with    Follow-up     Colon cancer metastasized to intra-abdominal lymph node ([address]    Cancer        Subjective: Here for  follow-up. Feels well today. Continues to work full-time. No c/o's        OBJECTIVE:  VITALS:  height is 6' 4\" (1.93 m) and weight is 185 lb 8 oz (84.1 kg). His temporal temperature is 97.5 °F (36.4 °C). His blood pressure is 131/68 and his pulse is 77. His oxygen saturation is 96%. Physical Exam:  Performance Status: 0  Well developed, well nourished male  EYES: sclera non-icteric   ENT: oropharynx clear. NECK: No lymphadenopathy. HEART: Regular rate and rhythm. LUNGS: Clear . ABDOMEN: Soft, nontender. No ascites. No mass or organomegaly. EXTREMITIES: without clubbing, cyanosis, or edema. NEUROLOGIC: No focal deficits. SKIN: No Rash        Medications  Prior to Admission medications    Medication Sig Start Date End Date Taking? Authorizing Provider   atorvastatin (LIPITOR) 10 MG tablet Take 10 mg by mouth daily   Yes Historical Provider, MD   Timolol Maleate (ISTALOL) 0.5 % (DAILY) SOLN Apply 1 drop to eye daily.  Both eyes   Yes Historical Provider, MD    Scheduled Meds:  Continuous Infusions:  PRN Meds:.        Recent Laboratory Data-     Lab Results   Component Value Date    WBC 6.7 12/06/2021    HGB 15.6 12/06/2021    HCT 45.8 12/06/2021    MCV 92.5 12/06/2021     12/06/2021    LYMPHOPCT 27.9 12/06/2021    RBC 4.95 12/06/2021    MCH 31.5 12/06/2021    MCHC 34.1 12/06/2021    RDW 12.7 12/06/2021    NEUTOPHILPCT 59.9 12/06/2021    MONOPCT 8.5 12/06/2021    BASOPCT 0.4 12/06/2021    NEUTROABS 4.01 12/06/2021    LYMPHSABS 1.87 12/06/2021    MONOSABS 0.57 12/06/2021    EOSABS 0.18 12/06/2021    BASOSABS 0.03 12/06/2021 Lab Results   Component Value Date     06/07/2021    K 3.9 06/07/2021     06/07/2021    CO2 22 06/07/2021    BUN 10 06/07/2021    CREATININE 0.8 06/07/2021    GLUCOSE 104 09/01/2021    CALCIUM 9.3 06/07/2021    PROT 7.0 06/07/2021    LABALBU 4.4 06/07/2021    BILITOT 0.7 06/07/2021    ALKPHOS 69 06/07/2021    AST 21 06/07/2021    ALT 17 06/07/2021    LABGLOM >60 06/07/2021    GFRAA >60 06/07/2021         Lab Results   Component Value Date    IRON 53 (L) 05/06/2013    TIBC 350 05/06/2013    FERRITIN 54.1 05/06/2013         No results found for:       Lab Results   Component Value Date    CEA 4.0 12/06/2021         Radiology-  No results found. ASSESSMENT/PLAN:  3 A 68-year-old man with Hx of colon ca involving cecum S/P laparoscopic right hemicolectomy with clear margins on 4/17/2013 . 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 2-  Iron deficiency anemia resolved. 3-  Mild elevation in Transaminases related to Pravastatin resolved          CT scan of Chest, Abdomen and Pelvis in May 2016 were negative without evidence of disease recurrence . Follow up colonoscopy done by Dr. Lenin Hassan 12/11/14 with no significant findings. He is doing well without evidence of disease recurrence. To continue surveillance  His follow-up CT scan of  abdomen and pelvis in April 2018 was essentially negative without any evidence of disease recurrence and his  follow-up colonoscopy in December 2017 By Dr. Selena Sparks was unremarkable.     He follows with Dr. Justina Arana his PCP for his hyperlipidemia. Recent lipid profile was unremarkable  He no longer requires any follow-up scans and his next colonoscopy will be scheduled in December 2020. ..     He has borderline diabetes and will follow with Dr. Gisella Irving and will go on a diet      He also has borderline hyperlipidemia.   He would like to postpone his colonoscopy till next year till the COVID-19 pandemic eases. 6/07/2021  Completed 2 nd dose of Covid vaccine  He has gained few pounds. No abdominal complaints. He will follow with Dr. Kamlesh Muller regarding his hyperlipidemia. He will be scheduled with GI for follow-up colonoscopy. He is doing well without evidence of disease recurrence. 12/6/21  Completed  Covid booster. S/P colonoscopy on 9/9 with findings of mild diverticulosis. Follows with Dr Colleen Lopez for [address] on Atorvastatin  No evidence of disease recurrence    Madai Schmid. Suze Segundo M.D., F.A.C.P.   Electronically signed 12/6/2021 at 3:18 PM

## 2022-02-20 ENCOUNTER — HOSPITAL ENCOUNTER (EMERGENCY)
Age: 67
Discharge: HOME | End: 2022-02-20
Attending: EMERGENCY MEDICINE
Payer: COMMERCIAL

## 2022-02-20 ENCOUNTER — APPOINTMENT (OUTPATIENT)
Dept: CT IMAGING | Age: 67
End: 2022-02-20
Payer: COMMERCIAL

## 2022-02-20 VITALS
BODY MASS INDEX: 26.41 KG/M2 | RESPIRATION RATE: 18 BRPM | SYSTOLIC BLOOD PRESSURE: 138 MMHG | TEMPERATURE: 96 F | OXYGEN SATURATION: 98 % | WEIGHT: 195 LBS | HEART RATE: 82 BPM | DIASTOLIC BLOOD PRESSURE: 68 MMHG | HEIGHT: 72 IN

## 2022-02-20 DIAGNOSIS — S00.01XA ABRASION OF SCALP, INITIAL ENCOUNTER: Primary | ICD-10-CM

## 2022-02-20 PROCEDURE — 99283 EMERGENCY DEPT VISIT LOW MDM: CPT

## 2022-02-20 PROCEDURE — 90714 TD VACC NO PRESV 7 YRS+ IM: CPT | Performed by: EMERGENCY MEDICINE

## 2022-02-20 PROCEDURE — 70450 CT HEAD/BRAIN W/O DYE: CPT

## 2022-02-20 PROCEDURE — 6360000002 HC RX W HCPCS: Performed by: EMERGENCY MEDICINE

## 2022-02-20 PROCEDURE — 90471 IMMUNIZATION ADMIN: CPT | Performed by: EMERGENCY MEDICINE

## 2022-02-20 PROCEDURE — 72125 CT NECK SPINE W/O DYE: CPT

## 2022-02-20 RX ADMIN — CLOSTRIDIUM TETANI TOXOID ANTIGEN (FORMALDEHYDE INACTIVATED) AND CORYNEBACTERIUM DIPHTHERIAE TOXOID ANTIGEN (FORMALDEHYDE INACTIVATED) 0.5 ML: 5; 2 INJECTION, SUSPENSION INTRAMUSCULAR at 01:37

## 2022-02-20 ASSESSMENT — ENCOUNTER SYMPTOMS
VOMITING: 0
EYE REDNESS: 0
COUGH: 0
NAUSEA: 0
EYE PAIN: 0
SINUS PRESSURE: 0
SORE THROAT: 0
BACK PAIN: 0
EYE DISCHARGE: 0
WHEEZING: 0
DIARRHEA: 0
SHORTNESS OF BREATH: 0
ABDOMINAL PAIN: 0

## 2022-02-20 ASSESSMENT — PAIN DESCRIPTION - ORIENTATION: ORIENTATION: POSTERIOR

## 2022-02-20 ASSESSMENT — PAIN DESCRIPTION - PAIN TYPE: TYPE: ACUTE PAIN

## 2022-02-20 ASSESSMENT — PAIN DESCRIPTION - LOCATION: LOCATION: HEAD

## 2022-02-20 ASSESSMENT — PAIN DESCRIPTION - FREQUENCY: FREQUENCY: CONTINUOUS

## 2022-02-20 ASSESSMENT — PAIN DESCRIPTION - DESCRIPTORS: DESCRIPTORS: ACHING

## 2022-02-20 NOTE — ED PROVIDER NOTES
Patient is a 62 y/o male who presents to the ED after a head injury. Patient states he slipped on ice tonight and fell striking the back of his head. He denies any loss of consciousness. He states that he was confused following the fall. He currently has a headache. He denies any neck pain. He is not on blood thinners. Tetanus is out of date. Review of Systems   Constitutional: Negative for chills and fever. HENT: Negative for ear pain, sinus pressure and sore throat. Eyes: Negative for pain, discharge and redness. Respiratory: Negative for cough, shortness of breath and wheezing. Cardiovascular: Negative for chest pain. Gastrointestinal: Negative for abdominal pain, diarrhea, nausea and vomiting. Genitourinary: Negative for dysuria and frequency. Musculoskeletal: Negative for arthralgias and back pain. Skin: Positive for wound. Negative for rash. Neurological: Positive for headaches. Negative for weakness. Hematological: Negative for adenopathy. Psychiatric/Behavioral: Positive for confusion. All other systems reviewed and are negative. Physical Exam  Vitals and nursing note reviewed. HENT:      Head:      Comments: Quarter-sized circular abrasion posterior scalp. No lacerations noted. Right Ear: External ear normal.      Left Ear: External ear normal.      Nose: Nose normal.      Mouth/Throat:      Mouth: Mucous membranes are moist.   Eyes:      Conjunctiva/sclera: Conjunctivae normal.      Pupils: Pupils are equal, round, and reactive to light. Neck:      Comments: No midline cervical tenderness to palpation. Cardiovascular:      Rate and Rhythm: Normal rate and regular rhythm. Heart sounds: No murmur heard. Pulmonary:      Effort: Pulmonary effort is normal. No respiratory distress. Breath sounds: Normal breath sounds. No stridor. No wheezing, rhonchi or rales. Abdominal:      General: Bowel sounds are normal. There is no distension. Tenderness: There is no abdominal tenderness. There is no guarding. Musculoskeletal:         General: Normal range of motion. Cervical back: Normal range of motion and neck supple. No tenderness. Skin:     General: Skin is warm and dry. Neurological:      Mental Status: He is alert and oriented to person, place, and time. Procedures     [facility]             --------------------------------------------- PAST HISTORY ---------------------------------------------  Past Medical History:  has a past medical history of Allergic rhinitis, cause unspecified, Apnea, Bronchitis, Cancer ([address] Esophageal reflux, Mitral valve disorders(424.0), Mixed hyperlipidemia, Myalgia and myositis, unspecified, Other acute sinusitis, Pneumonia, Sleep apnea, Sprain of tibiofibular (joint) (ligament) superior, of knee, and Unspecified glaucoma(365.9). Past Surgical History:  has a past surgical history that includes sinus surgery; Testicle surgery; eye surgery; Foot surgery; right colectomy (Right, APRIL 2013); Colonoscopy; and Colonoscopy (N/A, 9/9/2021). Social History:  reports that he quit smoking about 35 years ago. He quit after 10.00 years of use. He has quit using smokeless tobacco. He reports current alcohol use. He reports that he does not use drugs. Family History: family history includes Diabetes in his mother; Heart Disease in his father. The patients home medications have been reviewed. Allergies: Levofloxacin and Simvastatin    -------------------------------------------------- RESULTS -------------------------------------------------  Labs:  No results found for this visit on 02/20/22. Radiology:  CT HEAD WO CONTRAST   Final Result   No acute intracranial abnormality. No acute abnormality in the cervical spine. CT CERVICAL SPINE WO CONTRAST   Final Result   No acute intracranial abnormality.       No acute abnormality in the cervical spine.             ------------------------- NURSING NOTES AND VITALS REVIEWED ---------------------------  Date / Time Roomed:  2/20/2022 12:15 AM  ED Bed Assignment:  08/08    The nursing notes within the ED encounter and vital signs as below have been reviewed. /68   Pulse 82   Temp 96 °F (35.6 °C) (Infrared)   Resp 18   Ht 6' (1.829 m)   Wt 195 lb (88.5 kg)   SpO2 98%   BMI 26.45 kg/m²   Oxygen Saturation Interpretation: Normal      ------------------------------------------ PROGRESS NOTES ------------------------------------------  I have spoken with the patient and discussed todays results, in addition to providing specific details for the plan of care and counseling regarding the diagnosis and prognosis. Their questions are answered at this time and they are agreeable with the plan. I discussed at length with them reasons for immediate return here for re evaluation. They will followup with primary care by calling their office tomorrow. --------------------------------- ADDITIONAL PROVIDER NOTES ---------------------------------  At this time the patient is without objective evidence of an acute process requiring hospitalization or inpatient management. They have remained hemodynamically stable throughout their entire ED visit and are stable for discharge with outpatient follow-up. The plan has been discussed in detail and they are aware of the specific conditions for emergent return, as well as the importance of follow-up. New Prescriptions    No medications on file       Diagnosis:  1. Abrasion of scalp, initial encounter        Disposition:  Patient's disposition: Discharge to home  Patient's condition is stable.          [address],   02/20/22 [id number]

## 2022-06-22 ENCOUNTER — HOSPITAL ENCOUNTER (OUTPATIENT)
Dept: INFUSION THERAPY | Age: 67
Discharge: HOME | End: 2022-06-22
Payer: COMMERCIAL

## 2022-06-22 ENCOUNTER — OFFICE VISIT (OUTPATIENT)
Dept: ONCOLOGY | Age: 67
End: 2022-06-22
Payer: COMMERCIAL

## 2022-06-22 VITALS
TEMPERATURE: 98.7 F | DIASTOLIC BLOOD PRESSURE: 74 MMHG | SYSTOLIC BLOOD PRESSURE: 130 MMHG | HEART RATE: 81 BPM | OXYGEN SATURATION: 97 % | WEIGHT: 196.5 LBS | BODY MASS INDEX: 26.61 KG/M2 | HEIGHT: 72 IN

## 2022-06-22 DIAGNOSIS — C77.2 COLON CANCER METASTASIZED TO INTRA-ABDOMINAL LYMPH NODE (HCC): ICD-10-CM

## 2022-06-22 DIAGNOSIS — C18.9 COLON CANCER METASTASIZED TO INTRA-ABDOMINAL LYMPH NODE (HCC): Primary | ICD-10-CM

## 2022-06-22 DIAGNOSIS — C77.2 COLON CANCER METASTASIZED TO INTRA-ABDOMINAL LYMPH NODE (HCC): Primary | ICD-10-CM

## 2022-06-22 DIAGNOSIS — C18.9 COLON CANCER METASTASIZED TO INTRA-ABDOMINAL LYMPH NODE (HCC): ICD-10-CM

## 2022-06-22 LAB
ALBUMIN SERPL-MCNC: 4.3 G/DL (ref 3.5–5.2)
ALP BLD-CCNC: 70 U/L (ref 40–129)
ALT SERPL-CCNC: 27 U/L (ref 0–40)
ANION GAP SERPL CALCULATED.3IONS-SCNC: 10 MMOL/L (ref 7–16)
AST SERPL-CCNC: 29 U/L (ref 0–39)
BASOPHILS ABSOLUTE: 0.03 E9/L (ref 0–0.2)
BASOPHILS RELATIVE PERCENT: 0.5 % (ref 0–2)
BILIRUB SERPL-MCNC: 0.5 MG/DL (ref 0–1.2)
BUN BLDV-MCNC: 10 MG/DL (ref 6–23)
CALCIUM SERPL-MCNC: 9.5 MG/DL (ref 8.6–10.2)
CEA: 3.8 NG/ML (ref 0–5.2)
CHLORIDE BLD-SCNC: 102 MMOL/L (ref 98–107)
CO2: 23 MMOL/L (ref 22–29)
CREAT SERPL-MCNC: 0.7 MG/DL (ref 0.7–1.2)
EOSINOPHILS ABSOLUTE: 0.19 E9/L (ref 0.05–0.5)
EOSINOPHILS RELATIVE PERCENT: 3.4 % (ref 0–6)
GFR AFRICAN AMERICAN: >60
GFR NON-AFRICAN AMERICAN: >60 ML/MIN/1.73
GLUCOSE BLD-MCNC: 105 MG/DL (ref 74–99)
HCT VFR BLD CALC: 44 % (ref 37–54)
HEMOGLOBIN: 14.8 G/DL (ref 12.5–16.5)
IMMATURE GRANULOCYTES #: 0.01 E9/L
IMMATURE GRANULOCYTES %: 0.2 % (ref 0–5)
LYMPHOCYTES ABSOLUTE: 1.94 E9/L (ref 1.5–4)
LYMPHOCYTES RELATIVE PERCENT: 34.5 % (ref 20–42)
MCH RBC QN AUTO: 31.7 PG (ref 26–35)
MCHC RBC AUTO-ENTMCNC: 33.6 % (ref 32–34.5)
MCV RBC AUTO: 94.2 FL (ref 80–99.9)
MONOCYTES ABSOLUTE: 0.54 E9/L (ref 0.1–0.95)
MONOCYTES RELATIVE PERCENT: 9.6 % (ref 2–12)
NEUTROPHILS ABSOLUTE: 2.91 E9/L (ref 1.8–7.3)
NEUTROPHILS RELATIVE PERCENT: 51.8 % (ref 43–80)
PDW BLD-RTO: 12.8 FL (ref 11.5–15)
PLATELET # BLD: 204 E9/L (ref 130–450)
PMV BLD AUTO: 9.6 FL (ref 7–12)
POTASSIUM SERPL-SCNC: 4.1 MMOL/L (ref 3.5–5)
RBC # BLD: 4.67 E12/L (ref 3.8–5.8)
SODIUM BLD-SCNC: 135 MMOL/L (ref 132–146)
TOTAL PROTEIN: 7 G/DL (ref 6.4–8.3)
WBC # BLD: 5.6 E9/L (ref 4.5–11.5)

## 2022-06-22 PROCEDURE — 99212 OFFICE O/P EST SF 10 MIN: CPT

## 2022-06-22 PROCEDURE — 36415 COLL VENOUS BLD VENIPUNCTURE: CPT

## 2022-06-22 PROCEDURE — 82378 CARCINOEMBRYONIC ANTIGEN: CPT

## 2022-06-22 PROCEDURE — 85025 COMPLETE CBC W/AUTO DIFF WBC: CPT

## 2022-06-22 PROCEDURE — 80053 COMPREHEN METABOLIC PANEL: CPT

## 2022-06-22 NOTE — PROGRESS NOTES
[address] [facility]. [facility], [place]        Pt Name: Rama Win  YOB: 1955  Date of evaluation: 6/22/2022  Primary Care Physician: Anai Rivera MD  Reason for evaluation:   Chief Complaint   Patient presents with    Colon Cancer     Metastasized to intra-abdominal lymph node    6 Month Follow-Up        Subjective: Here for  follow-up. Feels well today. Continues to work full-time. No c/o's        OBJECTIVE:  VITALS:  height is 6' 4\" (1.93 m) and weight is 185 lb 8 oz (84.1 kg). His temporal temperature is 97.5 °F (36.4 °C). His blood pressure is 131/68 and his pulse is 77. His oxygen saturation is 96%. Physical Exam:  Performance Status: 0  Well developed, well nourished male  EYES: sclera non-icteric   ENT: oropharynx clear. NECK: No lymphadenopathy. HEART: Regular rate and rhythm. LUNGS: Clear . ABDOMEN: Soft, nontender. No ascites. No mass or organomegaly. EXTREMITIES: without clubbing, cyanosis, or edema. NEUROLOGIC: No focal deficits. SKIN: No Rash        Medications  Prior to Admission medications    Medication Sig Start Date End Date Taking? Authorizing Provider   atorvastatin (LIPITOR) 10 MG tablet Take 10 mg by mouth daily    Historical Provider, MD   Timolol Maleate (ISTALOL) 0.5 % (DAILY) SOLN Apply 1 drop to eye daily.  Both eyes    Historical Provider, MD    Scheduled Meds:  Continuous Infusions:  PRN Meds:.        Recent Laboratory Data-     Lab Results   Component Value Date    WBC 6.4 04/22/2022    HGB 15.2 04/22/2022    HCT 44.9 04/22/2022    MCV 90.2 04/22/2022     04/22/2022    LYMPHOPCT 25.0 04/22/2022    RBC 4.98 04/22/2022    MCH 30.5 04/22/2022    MCHC 33.9 04/22/2022    RDW 13.1 04/22/2022    NEUTOPHILPCT 62.6 04/22/2022    MONOPCT 7.8 04/22/2022    BASOPCT 0.8 04/22/2022    NEUTROABS 4.01 04/22/2022    LYMPHSABS 1.60 04/22/2022    MONOSABS 0.50 04/22/2022    EOSABS 0.21 04/22/2022    BASOSABS 0.05 04/22/2022       Lab Results   Component Value Date     04/22/2022    K 4.2 04/22/2022     04/22/2022    CO2 21 (L) 04/22/2022    BUN 13 04/22/2022    CREATININE 0.7 04/22/2022    GLUCOSE 116 (H) 04/22/2022    CALCIUM 9.2 04/22/2022    PROT 7.0 04/22/2022    LABALBU 4.5 04/22/2022    BILITOT 0.6 04/22/2022    ALKPHOS 59 04/22/2022    AST 28 04/22/2022    ALT 24 04/22/2022    LABGLOM >60 04/22/2022    GFRAA >60 04/22/2022         Lab Results   Component Value Date    IRON 53 (L) 05/06/2013    TIBC 350 05/06/2013    FERRITIN 54.1 05/06/2013         No results found for:       Lab Results   Component Value Date    CEA 4.0 12/06/2021         Radiology-  No results found. ASSESSMENT/PLAN:  3 A 72-year-old man with Hx of colon ca involving cecum S/P laparoscopic right hemicolectomy with clear margins on 4/17/2013 . 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 2-  Iron deficiency anemia resolved. 3-  Mild elevation in Transaminases related to Pravastatin resolved          CT scan of Chest, Abdomen and Pelvis in May 2016 were negative without evidence of disease recurrence . Follow up colonoscopy done by Dr. Nina Amin 12/11/14 with no significant findings. He is doing well without evidence of disease recurrence. To continue surveillance  His follow-up CT scan of  abdomen and pelvis in April 2018 was essentially negative without any evidence of disease recurrence and his  follow-up colonoscopy in December 2017 By Dr. Lanette Truong was unremarkable.     He follows with Dr. Alfonso Hernandez his PCP for his hyperlipidemia. Recent lipid profile was unremarkable  He no longer requires any follow-up scans and his next colonoscopy will be scheduled in December 2020. ..     He has borderline diabetes and will follow with Dr. Bruce Graham and will go on a diet      He also has borderline hyperlipidemia.   He would like to postpone his colonoscopy till next year till the COVID-19 pandemic eases. 6/07/2021  Completed 2 nd dose of Covid vaccine  He has gained few pounds. No abdominal complaints. He will follow with Dr. Deisi Montelongo regarding his hyperlipidemia. He will be scheduled with GI for follow-up colonoscopy. He is doing well without evidence of disease recurrence. 12/6/21  Completed  Covid booster. S/P colonoscopy on 9/9 with findings of mild diverticulosis. Follows with Dr Bang Cox for [address] on Atorvastatin  No evidence of disease recurrence. 6/22/2022  Doing well. Denies any abdominal complaints. Last colonoscopy was in September 2021 with findings of diverticulosis. Continues on eyedrops for glaucoma. Remains on atorvastatin for hyperlipidemia. His exam is negative and all his labs were reviewed. Of concern is his minimally elevated PSA at 4.2 however his free PSA was normal.  He does have strong family history of prostate cancer in 2 maternal uncles and a cousin and his PSA to be monitored and if it rises further he will need urologic evaluation    Kathrine Sheehan. Pedro Dominguez M.D., F.A.C.P.   Electronically signed 6/22/2022 at 7:22 AM

## 2022-09-17 LAB
CHOLESTEROL, TOTAL: 205 MG/DL (ref 0–199)
GLUCOSE BLD-MCNC: 99 MG/DL (ref 74–107)
HDLC SERPL-MCNC: 43 MG/DL
LDL CHOLESTEROL CALCULATED: 131 MG/DL (ref 0–99)
TRIGL SERPL-MCNC: 157 MG/DL (ref 0–149)

## 2022-12-14 ENCOUNTER — OFFICE VISIT (OUTPATIENT)
Dept: ONCOLOGY | Age: 67
End: 2022-12-14
Payer: COMMERCIAL

## 2022-12-14 ENCOUNTER — HOSPITAL ENCOUNTER (OUTPATIENT)
Dept: INFUSION THERAPY | Age: 67
Discharge: HOME | End: 2022-12-14
Payer: COMMERCIAL

## 2022-12-14 VITALS
WEIGHT: 202.6 LBS | HEART RATE: 87 BPM | SYSTOLIC BLOOD PRESSURE: 143 MMHG | OXYGEN SATURATION: 98 % | DIASTOLIC BLOOD PRESSURE: 82 MMHG | TEMPERATURE: 98.2 F | HEIGHT: 72 IN | BODY MASS INDEX: 27.44 KG/M2

## 2022-12-14 DIAGNOSIS — C77.2 COLON CANCER METASTASIZED TO INTRA-ABDOMINAL LYMPH NODE (HCC): Primary | ICD-10-CM

## 2022-12-14 DIAGNOSIS — C77.2 COLON CANCER METASTASIZED TO INTRA-ABDOMINAL LYMPH NODE (HCC): ICD-10-CM

## 2022-12-14 DIAGNOSIS — N41.1 CHRONIC PROSTATITIS: ICD-10-CM

## 2022-12-14 DIAGNOSIS — C18.9 COLON CANCER METASTASIZED TO INTRA-ABDOMINAL LYMPH NODE (HCC): ICD-10-CM

## 2022-12-14 DIAGNOSIS — C18.9 COLON CANCER METASTASIZED TO INTRA-ABDOMINAL LYMPH NODE (HCC): Primary | ICD-10-CM

## 2022-12-14 LAB
ALBUMIN SERPL-MCNC: 4.4 G/DL (ref 3.5–5.2)
ALP BLD-CCNC: 99 U/L (ref 40–129)
ALT SERPL-CCNC: 24 U/L (ref 0–40)
ANION GAP SERPL CALCULATED.3IONS-SCNC: 10 MMOL/L (ref 7–16)
AST SERPL-CCNC: 26 U/L (ref 0–39)
BASOPHILS ABSOLUTE: 0.04 E9/L (ref 0–0.2)
BASOPHILS RELATIVE PERCENT: 0.5 % (ref 0–2)
BILIRUB SERPL-MCNC: 0.6 MG/DL (ref 0–1.2)
BUN BLDV-MCNC: 11 MG/DL (ref 6–23)
CALCIUM SERPL-MCNC: 9.5 MG/DL (ref 8.6–10.2)
CEA: 3.8 NG/ML (ref 0–5.2)
CHLORIDE BLD-SCNC: 100 MMOL/L (ref 98–107)
CO2: 28 MMOL/L (ref 22–29)
CREAT SERPL-MCNC: 0.7 MG/DL (ref 0.7–1.2)
EOSINOPHILS ABSOLUTE: 0.17 E9/L (ref 0.05–0.5)
EOSINOPHILS RELATIVE PERCENT: 2 % (ref 0–6)
GFR SERPL CREATININE-BSD FRML MDRD: >60 ML/MIN/1.73
GLUCOSE BLD-MCNC: 112 MG/DL (ref 74–99)
HCT VFR BLD CALC: 46.3 % (ref 37–54)
HEMOGLOBIN: 15.5 G/DL (ref 12.5–16.5)
IMMATURE GRANULOCYTES #: 0.03 E9/L
IMMATURE GRANULOCYTES %: 0.4 % (ref 0–5)
LYMPHOCYTES ABSOLUTE: 1.72 E9/L (ref 1.5–4)
LYMPHOCYTES RELATIVE PERCENT: 20.5 % (ref 20–42)
MCH RBC QN AUTO: 31.3 PG (ref 26–35)
MCHC RBC AUTO-ENTMCNC: 33.5 % (ref 32–34.5)
MCV RBC AUTO: 93.3 FL (ref 80–99.9)
MONOCYTES ABSOLUTE: 0.64 E9/L (ref 0.1–0.95)
MONOCYTES RELATIVE PERCENT: 7.6 % (ref 2–12)
NEUTROPHILS ABSOLUTE: 5.77 E9/L (ref 1.8–7.3)
NEUTROPHILS RELATIVE PERCENT: 69 % (ref 43–80)
PDW BLD-RTO: 12.3 FL (ref 11.5–15)
PLATELET # BLD: 241 E9/L (ref 130–450)
PMV BLD AUTO: 8.9 FL (ref 7–12)
POTASSIUM SERPL-SCNC: 4.2 MMOL/L (ref 3.5–5)
RBC # BLD: 4.96 E12/L (ref 3.8–5.8)
SODIUM BLD-SCNC: 138 MMOL/L (ref 132–146)
TOTAL PROTEIN: 7.4 G/DL (ref 6.4–8.3)
WBC # BLD: 8.4 E9/L (ref 4.5–11.5)

## 2022-12-14 PROCEDURE — 99212 OFFICE O/P EST SF 10 MIN: CPT

## 2022-12-14 PROCEDURE — 36415 COLL VENOUS BLD VENIPUNCTURE: CPT

## 2022-12-14 PROCEDURE — 80053 COMPREHEN METABOLIC PANEL: CPT

## 2022-12-14 PROCEDURE — 85025 COMPLETE CBC W/AUTO DIFF WBC: CPT

## 2022-12-14 PROCEDURE — 82378 CARCINOEMBRYONIC ANTIGEN: CPT

## 2022-12-14 PROCEDURE — 84153 ASSAY OF PSA TOTAL: CPT

## 2022-12-14 RX ORDER — ATORVASTATIN CALCIUM 20 MG/1
TABLET, FILM COATED ORAL
COMMUNITY
Start: 2022-10-07

## 2022-12-14 RX ORDER — METOPROLOL SUCCINATE 25 MG/1
TABLET, EXTENDED RELEASE ORAL
COMMUNITY
Start: 2022-10-07

## 2022-12-14 NOTE — PROGRESS NOTES
[address] [facility]. [facility] [place]        Pt Name: Anayeli Lacey  YOB: 1955  Date of evaluation: 12/14/2022  Primary Care Physician: Katherine Brewer MD  Reason for evaluation:   Chief Complaint   Patient presents with    Colon Cancer     Cancer of the cecum metastasized to intra-abdominal lymph node    6 Month Follow-Up        Subjective: Here for  follow-up. Feels well today. Continues to work full-time. No c/o's        OBJECTIVE:  VITALS:  height is 6' 4\" (1.93 m) and weight is 185 lb 8 oz (84.1 kg). His temporal temperature is 97.5 °F (36.4 °C). His blood pressure is 131/68 and his pulse is 77. His oxygen saturation is 96%. Physical Exam:  Performance Status: 0  Well developed, well nourished male  EYES: sclera non-icteric   ENT: oropharynx clear. NECK: No lymphadenopathy. HEART: Regular rate and rhythm. LUNGS: Clear . ABDOMEN: Soft, nontender. No ascites. No mass or organomegaly. EXTREMITIES: without clubbing, cyanosis, or edema. NEUROLOGIC: No focal deficits. SKIN: No Rash        Medications  Prior to Admission medications    Medication Sig Start Date End Date Taking? Authorizing Provider   atorvastatin (LIPITOR) 10 MG tablet Take 10 mg by mouth daily    Historical Provider, MD   Timolol Maleate (ISTALOL) 0.5 % (DAILY) SOLN Apply 1 drop to eye daily.  Both eyes    Historical Provider, MD    Scheduled Meds:  Continuous Infusions:  PRN Meds:.        Recent Laboratory Data-     Lab Results   Component Value Date    WBC 8.4 12/14/2022    HGB 15.5 12/14/2022    HCT 46.3 12/14/2022    MCV 93.3 12/14/2022     12/14/2022    LYMPHOPCT 20.5 12/14/2022    RBC 4.96 12/14/2022    MCH 31.3 12/14/2022    MCHC 33.5 12/14/2022    RDW 12.3 12/14/2022    NEUTOPHILPCT 69.0 12/14/2022    MONOPCT 7.6 12/14/2022    BASOPCT 0.5 12/14/2022    NEUTROABS 5.77 12/14/2022    LYMPHSABS 1.72 12/14/2022    MONOSABS 0.64 12/14/2022    EOSABS 0.17 12/14/2022    BASOSABS 0.04 12/14/2022       Lab Results   Component Value Date     12/14/2022    K 4.2 12/14/2022     12/14/2022    CO2 28 12/14/2022    BUN 11 12/14/2022    CREATININE 0.7 12/14/2022    GLUCOSE 112 (H) 12/14/2022    CALCIUM 9.5 12/14/2022    PROT 7.4 12/14/2022    LABALBU 4.4 12/14/2022    BILITOT 0.6 12/14/2022    ALKPHOS 99 12/14/2022    AST 26 12/14/2022    ALT 24 12/14/2022    LABGLOM >60 12/14/2022    GFRAA >60 06/22/2022         Lab Results   Component Value Date    IRON 53 (L) 05/06/2013    TIBC 350 05/06/2013    FERRITIN 54.1 05/06/2013         No results found for:       Lab Results   Component Value Date    CEA 3.8 06/22/2022         Radiology-  No results found. ASSESSMENT/PLAN:  3 A 82-year-old man with Hx of colon ca involving cecum S/P laparoscopic right hemicolectomy with clear margins on 4/17/2013 . 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 2-  Iron deficiency anemia resolved. 3-  Mild elevation in Transaminases related to Pravastatin resolved          CT scan of Chest, Abdomen and Pelvis in May 2016 were negative without evidence of disease recurrence . Follow up colonoscopy done by Dr. Susan Bernal 12/11/14 with no significant findings. He is doing well without evidence of disease recurrence. To continue surveillance  His follow-up CT scan of  abdomen and pelvis in April 2018 was essentially negative without any evidence of disease recurrence and his  follow-up colonoscopy in December 2017 By Dr. Yudy Josue was unremarkable. He follows with Dr. Sandoval Scott his PCP for his hyperlipidemia. Recent lipid profile was unremarkable  He no longer requires any follow-up scans and his next colonoscopy will be scheduled in December 2020. .. He has borderline diabetes and will follow with Dr. Daniela Mckeon and will go on a diet      He also has borderline hyperlipidemia.   He would like to postpone his colonoscopy till next year till the COVID-19 pandemic eases. 6/07/2021  Completed 2 nd dose of Covid vaccine  He has gained few pounds. No abdominal complaints. He will follow with Dr. Corinna Vale regarding his hyperlipidemia. He will be scheduled with GI for follow-up colonoscopy. He is doing well without evidence of disease recurrence. 12/6/21  Completed  Covid booster. S/P colonoscopy on 9/9  with Dr Steve Doran with findings of mild diverticulosis. Follows with Dr Curtis Kat for [address] on Atorvastatin  No evidence of disease recurrence. 6/22/2022  Doing well. Denies any abdominal complaints. Last colonoscopy was in September 2021 with findings of diverticulosis. Continues on eyedrops for glaucoma. Remains on atorvastatin for hyperlipidemia. His exam is negative and all his labs were reviewed. Of concern is his minimally elevated PSA at 4.2 however his free PSA was normal.  He does have strong family history of prostate cancer in 2 maternal uncles and a cousin and his PSA to be monitored and if it rises further he will need urologic evaluation. 12/14/2022  Has been complaining of some sinus congestion and will see his PCP Dr. Corinna Vale tomorrow. No abdominal complaints. His last colonoscopy was done by Dr. Gaby Cristobal in September 2021 with only findings of diverticulosis. He continues on atorvastatin for hyperlipidemia and his dose has been increased to 20 mg daily. He also continues on metoprolol and timolol for his glaucoma. He has a strong family history of prostate cancer and his previous PSA done in April was 4.2 and it will be repeated again today. He has history of stage III colon cancer status post adjuvant FOLFOX and is doing well without any evidence of disease recurrence. Beatriz Graf M.D., F.A.C.P.   Electronically signed 12/14/2022 at 6:53 AM

## 2022-12-15 LAB — PROSTATE SPECIFIC ANTIGEN: 4.85 NG/ML (ref 0–4)

## 2023-06-09 ENCOUNTER — HOSPITAL ENCOUNTER (OUTPATIENT)
Age: 68
Discharge: HOME | End: 2023-06-09
Payer: COMMERCIAL

## 2023-06-09 LAB
ALBUMIN SERPL-MCNC: 4.4 G/DL (ref 3.5–5.2)
ALP SERPL-CCNC: 67 U/L (ref 40–129)
ALT SERPL-CCNC: 17 U/L (ref 0–40)
ANION GAP SERPL CALCULATED.3IONS-SCNC: 11 MMOL/L (ref 7–16)
AST SERPL-CCNC: 21 U/L (ref 0–39)
BILIRUB SERPL-MCNC: 0.8 MG/DL (ref 0–1.2)
BUN SERPL-MCNC: 12 MG/DL (ref 6–23)
CALCIUM SERPL-MCNC: 9.4 MG/DL (ref 8.6–10.2)
CHLORIDE SERPL-SCNC: 101 MMOL/L (ref 98–107)
CO2 SERPL-SCNC: 25 MMOL/L (ref 22–29)
CREAT SERPL-MCNC: 0.7 MG/DL (ref 0.7–1.2)
GLUCOSE SERPL-MCNC: 144 MG/DL (ref 74–99)
POTASSIUM SERPL-SCNC: 3.9 MMOL/L (ref 3.5–5)
PROT SERPL-MCNC: 7 G/DL (ref 6.4–8.3)
PSA SERPL-MCNC: 3.57 NG/ML (ref 0–4)
SODIUM SERPL-SCNC: 137 MMOL/L (ref 132–146)

## 2023-06-09 PROCEDURE — G0103 PSA SCREENING: HCPCS

## 2023-06-09 PROCEDURE — 80053 COMPREHEN METABOLIC PANEL: CPT

## 2023-06-09 PROCEDURE — 36415 COLL VENOUS BLD VENIPUNCTURE: CPT

## 2023-06-14 ENCOUNTER — HOSPITAL ENCOUNTER (OUTPATIENT)
Dept: INFUSION THERAPY | Age: 68
Discharge: HOME | End: 2023-06-14
Payer: COMMERCIAL

## 2023-06-14 DIAGNOSIS — C18.9 COLON CANCER METASTASIZED TO INTRA-ABDOMINAL LYMPH NODE (HCC): ICD-10-CM

## 2023-06-14 DIAGNOSIS — C77.2 COLON CANCER METASTASIZED TO INTRA-ABDOMINAL LYMPH NODE (HCC): ICD-10-CM

## 2023-06-14 LAB
ALBUMIN SERPL-MCNC: 4.5 G/DL (ref 3.5–5.2)
ALP SERPL-CCNC: 70 U/L (ref 40–129)
ALT SERPL-CCNC: 15 U/L (ref 0–40)
ANION GAP SERPL CALCULATED.3IONS-SCNC: 11 MMOL/L (ref 7–16)
AST SERPL-CCNC: 21 U/L (ref 0–39)
BASOPHILS # BLD: 0.03 E9/L (ref 0–0.2)
BASOPHILS NFR BLD: 0.5 % (ref 0–2)
BILIRUB SERPL-MCNC: 0.8 MG/DL (ref 0–1.2)
BUN SERPL-MCNC: 9 MG/DL (ref 6–23)
CALCIUM SERPL-MCNC: 9.4 MG/DL (ref 8.6–10.2)
CEA SERPL-MCNC: 5.7 NG/ML (ref 0–5.2)
CHLORIDE SERPL-SCNC: 104 MMOL/L (ref 98–107)
CO2 SERPL-SCNC: 23 MMOL/L (ref 22–29)
CREAT SERPL-MCNC: 0.7 MG/DL (ref 0.7–1.2)
EOSINOPHIL # BLD: 0.18 E9/L (ref 0.05–0.5)
EOSINOPHIL NFR BLD: 3 % (ref 0–6)
ERYTHROCYTE [DISTWIDTH] IN BLOOD BY AUTOMATED COUNT: 13.3 FL (ref 11.5–15)
GLUCOSE SERPL-MCNC: 110 MG/DL (ref 74–99)
HCT VFR BLD AUTO: 46.6 % (ref 37–54)
HGB BLD-MCNC: 15.8 G/DL (ref 12.5–16.5)
IMM GRANULOCYTES # BLD: 0.02 E9/L
IMM GRANULOCYTES NFR BLD: 0.3 % (ref 0–5)
LYMPHOCYTES # BLD: 1.83 E9/L (ref 1.5–4)
LYMPHOCYTES NFR BLD: 30.9 % (ref 20–42)
MCH RBC QN AUTO: 30.7 PG (ref 26–35)
MCHC RBC AUTO-ENTMCNC: 33.9 % (ref 32–34.5)
MCV RBC AUTO: 90.5 FL (ref 80–99.9)
MONOCYTES # BLD: 0.62 E9/L (ref 0.1–0.95)
MONOCYTES NFR BLD: 10.5 % (ref 2–12)
NEUTROPHILS # BLD: 3.25 E9/L (ref 1.8–7.3)
NEUTS SEG NFR BLD: 54.8 % (ref 43–80)
PLATELET # BLD AUTO: 219 E9/L (ref 130–450)
PMV BLD AUTO: 9 FL (ref 7–12)
POTASSIUM SERPL-SCNC: 4.6 MMOL/L (ref 3.5–5)
PROT SERPL-MCNC: 7.4 G/DL (ref 6.4–8.3)
RBC # BLD AUTO: 5.15 E12/L (ref 3.8–5.8)
SODIUM SERPL-SCNC: 138 MMOL/L (ref 132–146)
WBC # BLD: 5.9 E9/L (ref 4.5–11.5)

## 2023-06-14 PROCEDURE — 80053 COMPREHEN METABOLIC PANEL: CPT

## 2023-06-14 PROCEDURE — 82378 CARCINOEMBRYONIC ANTIGEN: CPT

## 2023-06-14 PROCEDURE — 36415 COLL VENOUS BLD VENIPUNCTURE: CPT

## 2023-06-14 PROCEDURE — 85025 COMPLETE CBC W/AUTO DIFF WBC: CPT

## 2023-06-14 PROCEDURE — 83036 HEMOGLOBIN GLYCOSYLATED A1C: CPT

## 2023-06-15 LAB — HBA1C MFR BLD: 5.7 % (ref 4–5.6)

## 2023-07-19 LAB
CHOLEST SERPL-MCNC: 190 MG/DL
GLUCOSE SERPL-MCNC: 117 MG/DL (ref 74–99)
HDLC SERPL-MCNC: 49 MG/DL
LDLC SERPL CALC-MCNC: 108 MG/DL
PATIENT FASTING?: YES
TRIGL SERPL-MCNC: 167 MG/DL
VLDLC SERPL CALC-MCNC: 33 MG/DL

## 2023-12-13 DIAGNOSIS — C77.2 COLON CANCER METASTASIZED TO INTRA-ABDOMINAL LYMPH NODE (HCC): Primary | ICD-10-CM

## 2023-12-13 DIAGNOSIS — C18.9 COLON CANCER METASTASIZED TO INTRA-ABDOMINAL LYMPH NODE (HCC): Primary | ICD-10-CM

## 2023-12-13 DIAGNOSIS — N41.1 CHRONIC PROSTATITIS: ICD-10-CM

## 2023-12-15 ENCOUNTER — HOSPITAL ENCOUNTER (OUTPATIENT)
Age: 68
Discharge: HOME | End: 2023-12-15
Payer: COMMERCIAL

## 2023-12-15 DIAGNOSIS — C77.2 COLON CANCER METASTASIZED TO INTRA-ABDOMINAL LYMPH NODE (HCC): ICD-10-CM

## 2023-12-15 DIAGNOSIS — N41.1 CHRONIC PROSTATITIS: ICD-10-CM

## 2023-12-15 DIAGNOSIS — C18.9 COLON CANCER METASTASIZED TO INTRA-ABDOMINAL LYMPH NODE (HCC): ICD-10-CM

## 2023-12-15 DIAGNOSIS — E88.810 METABOLIC SYNDROME: ICD-10-CM

## 2023-12-15 LAB
ALBUMIN SERPL-MCNC: 4.6 G/DL (ref 3.5–5.2)
ALP SERPL-CCNC: 75 U/L (ref 40–129)
ALT SERPL-CCNC: 24 U/L (ref 0–40)
ANION GAP SERPL CALCULATED.3IONS-SCNC: 8 MMOL/L (ref 7–16)
AST SERPL-CCNC: 25 U/L (ref 0–39)
BASOPHILS # BLD: 0.04 K/UL (ref 0–0.2)
BASOPHILS NFR BLD: 1 % (ref 0–2)
BILIRUB SERPL-MCNC: 0.8 MG/DL (ref 0–1.2)
BUN SERPL-MCNC: 9 MG/DL (ref 6–23)
CALCIUM SERPL-MCNC: 9.3 MG/DL (ref 8.6–10.2)
CEA SERPL-MCNC: 5 NG/ML (ref 0–5.2)
CHLORIDE SERPL-SCNC: 102 MMOL/L (ref 98–107)
CHOLEST SERPL-MCNC: 216 MG/DL
CO2 SERPL-SCNC: 26 MMOL/L (ref 22–29)
CREAT SERPL-MCNC: 0.7 MG/DL (ref 0.7–1.2)
EOSINOPHIL # BLD: 0.17 K/UL (ref 0.05–0.5)
EOSINOPHILS RELATIVE PERCENT: 3 % (ref 0–6)
ERYTHROCYTE [DISTWIDTH] IN BLOOD BY AUTOMATED COUNT: 12.5 % (ref 11.5–15)
GFR SERPL CREATININE-BSD FRML MDRD: >60 ML/MIN/1.73M2
GLUCOSE SERPL-MCNC: 125 MG/DL (ref 74–99)
HBA1C MFR BLD: 6 % (ref 4–5.6)
HCT VFR BLD AUTO: 45.9 % (ref 37–54)
HDLC SERPL-MCNC: 40 MG/DL
HGB BLD-MCNC: 15.5 G/DL (ref 12.5–16.5)
IMM GRANULOCYTES # BLD AUTO: <0.03 K/UL (ref 0–0.58)
IMM GRANULOCYTES NFR BLD: 0 % (ref 0–5)
LDLC SERPL CALC-MCNC: 115 MG/DL
LYMPHOCYTES NFR BLD: 1.54 K/UL (ref 1.5–4)
LYMPHOCYTES RELATIVE PERCENT: 26 % (ref 20–42)
MCH RBC QN AUTO: 31.3 PG (ref 26–35)
MCHC RBC AUTO-ENTMCNC: 33.8 G/DL (ref 32–34.5)
MCV RBC AUTO: 92.5 FL (ref 80–99.9)
MONOCYTES NFR BLD: 0.5 K/UL (ref 0.1–0.95)
MONOCYTES NFR BLD: 8 % (ref 2–12)
NEUTROPHILS NFR BLD: 62 % (ref 43–80)
NEUTS SEG NFR BLD: 3.71 K/UL (ref 1.8–7.3)
PLATELET # BLD AUTO: 203 K/UL (ref 130–450)
PMV BLD AUTO: 8.8 FL (ref 7–12)
POTASSIUM SERPL-SCNC: 4.4 MMOL/L (ref 3.5–5)
PROT SERPL-MCNC: 7.5 G/DL (ref 6.4–8.3)
PSA SERPL-MCNC: 3.59 NG/ML (ref 0–4)
RBC # BLD AUTO: 4.96 M/UL (ref 3.8–5.8)
SODIUM SERPL-SCNC: 136 MMOL/L (ref 132–146)
TRIGL SERPL-MCNC: 305 MG/DL
VLDLC SERPL CALC-MCNC: 61 MG/DL
WBC OTHER # BLD: 6 K/UL (ref 4.5–11.5)

## 2023-12-15 PROCEDURE — 84153 ASSAY OF PSA TOTAL: CPT

## 2023-12-15 PROCEDURE — 80053 COMPREHEN METABOLIC PANEL: CPT

## 2023-12-15 PROCEDURE — 80061 LIPID PANEL: CPT

## 2023-12-15 PROCEDURE — 83036 HEMOGLOBIN GLYCOSYLATED A1C: CPT

## 2023-12-15 PROCEDURE — 36415 COLL VENOUS BLD VENIPUNCTURE: CPT

## 2023-12-15 PROCEDURE — 82378 CARCINOEMBRYONIC ANTIGEN: CPT

## 2023-12-15 PROCEDURE — 85025 COMPLETE CBC W/AUTO DIFF WBC: CPT

## 2023-12-20 ENCOUNTER — HOSPITAL ENCOUNTER (OUTPATIENT)
Dept: INFUSION THERAPY | Age: 68
Discharge: HOME | End: 2023-12-20

## 2023-12-20 DIAGNOSIS — C18.0 CARCINOMA OF CECUM (HCC): Primary | ICD-10-CM

## 2024-05-22 ENCOUNTER — HOSPITAL ENCOUNTER (OUTPATIENT)
Dept: INFUSION THERAPY | Age: 69
End: 2024-05-22

## 2024-06-04 ENCOUNTER — HOSPITAL ENCOUNTER (OUTPATIENT)
Dept: GENERAL RADIOLOGY | Age: 69
Discharge: HOME | End: 2024-06-06
Payer: COMMERCIAL

## 2024-06-04 ENCOUNTER — HOSPITAL ENCOUNTER (OUTPATIENT)
Age: 69
Discharge: HOME | End: 2024-06-06
Payer: COMMERCIAL

## 2024-06-04 DIAGNOSIS — R05.3 CHRONIC COUGH: ICD-10-CM

## 2024-06-04 PROCEDURE — 71046 X-RAY EXAM CHEST 2 VIEWS: CPT

## 2024-06-19 ENCOUNTER — HOSPITAL ENCOUNTER (OUTPATIENT)
Dept: INFUSION THERAPY | Age: 69
Discharge: HOME | End: 2024-06-19
Payer: COMMERCIAL

## 2024-06-19 ENCOUNTER — OFFICE VISIT (OUTPATIENT)
Dept: ONCOLOGY | Age: 69
End: 2024-06-19
Payer: COMMERCIAL

## 2024-06-19 VITALS
SYSTOLIC BLOOD PRESSURE: 108 MMHG | DIASTOLIC BLOOD PRESSURE: 73 MMHG | TEMPERATURE: 97.1 F | HEART RATE: 91 BPM | OXYGEN SATURATION: 96 % | BODY MASS INDEX: 25.67 KG/M2 | WEIGHT: 189.3 LBS

## 2024-06-19 DIAGNOSIS — C18.0 CARCINOMA OF CECUM (HCC): ICD-10-CM

## 2024-06-19 DIAGNOSIS — C77.2 COLON CANCER METASTASIZED TO INTRA-ABDOMINAL LYMPH NODE (HCC): ICD-10-CM

## 2024-06-19 DIAGNOSIS — C18.9 COLON CANCER METASTASIZED TO INTRA-ABDOMINAL LYMPH NODE (HCC): ICD-10-CM

## 2024-06-19 DIAGNOSIS — E88.810 METABOLIC SYNDROME: Primary | ICD-10-CM

## 2024-06-19 LAB
ALBUMIN SERPL-MCNC: 4.3 G/DL (ref 3.5–5.2)
ALP SERPL-CCNC: 82 U/L (ref 40–129)
ALT SERPL-CCNC: 19 U/L (ref 0–40)
ANION GAP SERPL CALCULATED.3IONS-SCNC: 9 MMOL/L (ref 7–16)
AST SERPL-CCNC: 22 U/L (ref 0–39)
BASOPHILS # BLD: 0.03 K/UL (ref 0–0.2)
BASOPHILS NFR BLD: 1 % (ref 0–2)
BILIRUB SERPL-MCNC: 0.9 MG/DL (ref 0–1.2)
BUN SERPL-MCNC: 11 MG/DL (ref 6–23)
CALCIUM SERPL-MCNC: 9.1 MG/DL (ref 8.6–10.2)
CEA SERPL-MCNC: 3.6 NG/ML (ref 0–5.2)
CHLORIDE SERPL-SCNC: 104 MMOL/L (ref 98–107)
CHOLEST SERPL-MCNC: 197 MG/DL
CO2 SERPL-SCNC: 24 MMOL/L (ref 22–29)
CREAT SERPL-MCNC: 0.7 MG/DL (ref 0.7–1.2)
EOSINOPHIL # BLD: 0.15 K/UL (ref 0.05–0.5)
EOSINOPHILS RELATIVE PERCENT: 2 % (ref 0–6)
ERYTHROCYTE [DISTWIDTH] IN BLOOD BY AUTOMATED COUNT: 12.9 % (ref 11.5–15)
GFR, ESTIMATED: >90 ML/MIN/1.73M2
GLUCOSE SERPL-MCNC: 125 MG/DL (ref 74–99)
HBA1C MFR BLD: 6.1 % (ref 4–5.6)
HCT VFR BLD AUTO: 44.1 % (ref 37–54)
HDLC SERPL-MCNC: 41 MG/DL
HGB BLD-MCNC: 14.9 G/DL (ref 12.5–16.5)
IMM GRANULOCYTES # BLD AUTO: <0.03 K/UL (ref 0–0.58)
IMM GRANULOCYTES NFR BLD: 0 % (ref 0–5)
LDLC SERPL CALC-MCNC: 105 MG/DL
LYMPHOCYTES NFR BLD: 1.61 K/UL (ref 1.5–4)
LYMPHOCYTES RELATIVE PERCENT: 26 % (ref 20–42)
MCH RBC QN AUTO: 30.5 PG (ref 26–35)
MCHC RBC AUTO-ENTMCNC: 33.8 G/DL (ref 32–34.5)
MCV RBC AUTO: 90.4 FL (ref 80–99.9)
MONOCYTES NFR BLD: 0.53 K/UL (ref 0.1–0.95)
MONOCYTES NFR BLD: 9 % (ref 2–12)
NEUTROPHILS NFR BLD: 62 % (ref 43–80)
NEUTS SEG NFR BLD: 3.84 K/UL (ref 1.8–7.3)
PLATELET # BLD AUTO: 217 K/UL (ref 130–450)
PMV BLD AUTO: 9.3 FL (ref 7–12)
POTASSIUM SERPL-SCNC: 4.2 MMOL/L (ref 3.5–5)
PROT SERPL-MCNC: 7.3 G/DL (ref 6.4–8.3)
PSA SERPL-MCNC: 6.69 NG/ML (ref 0–4)
RBC # BLD AUTO: 4.88 M/UL (ref 3.8–5.8)
SODIUM SERPL-SCNC: 137 MMOL/L (ref 132–146)
TRIGL SERPL-MCNC: 257 MG/DL
VLDLC SERPL CALC-MCNC: 51 MG/DL
WBC OTHER # BLD: 6.2 K/UL (ref 4.5–11.5)

## 2024-06-19 PROCEDURE — 36415 COLL VENOUS BLD VENIPUNCTURE: CPT

## 2024-06-19 PROCEDURE — 85025 COMPLETE CBC W/AUTO DIFF WBC: CPT

## 2024-06-19 PROCEDURE — 80061 LIPID PANEL: CPT

## 2024-06-19 PROCEDURE — 80053 COMPREHEN METABOLIC PANEL: CPT

## 2024-06-19 PROCEDURE — 84153 ASSAY OF PSA TOTAL: CPT

## 2024-06-19 PROCEDURE — 99212 OFFICE O/P EST SF 10 MIN: CPT

## 2024-06-19 PROCEDURE — 83036 HEMOGLOBIN GLYCOSYLATED A1C: CPT

## 2024-06-19 PROCEDURE — 82378 CARCINOEMBRYONIC ANTIGEN: CPT

## 2024-06-19 NOTE — PROGRESS NOTES
scheduled in December 2020...     He has borderline diabetes and will follow with Dr. Swift and will go on a diet      He also has borderline hyperlipidemia.  He would like to postpone his colonoscopy till next year till the COVID-19 pandemic eases.      6/07/2021  Completed 2 nd dose of Covid vaccine  He has gained few pounds.  No abdominal complaints.  He will follow with Dr. Swift regarding his hyperlipidemia.  He will be scheduled with GI for follow-up colonoscopy.  He is doing well without evidence of disease recurrence.      12/6/21  Completed  Covid booster.  S/P colonoscopy on 9/9  with Dr Moody with findings of mild diverticulosis.  Follows with Dr Flores for glaucoma  Continues on Atorvastatin  No evidence of disease recurrence.      6/22/2022  Doing well.  Denies any abdominal complaints.  Last colonoscopy was in September 2021 with findings of diverticulosis.  Continues on eyedrops for glaucoma.  Remains on atorvastatin for hyperlipidemia.  His exam is negative and all his labs were reviewed.  Of concern is his minimally elevated PSA at 4.2 however his free PSA was normal.  He does have strong family history of prostate cancer in 2 maternal uncles and a cousin and his PSA to be monitored and if it rises further he will need urologic evaluation.      12/14/2022  Has been complaining of some sinus congestion and will see his PCP Dr. Swift tomorrow.  No abdominal complaints.  His last colonoscopy was done by Dr. Vuong in September 2021 with only findings of diverticulosis.    He continues on atorvastatin for hyperlipidemia and his dose has been increased to 20 mg daily.  He also continues on metoprolol and timolol for his glaucoma.  He has a strong family history of prostate cancer and his previous PSA done in April was 4.2 and it will be repeated again today.    He has history of stage III colon cancer status post adjuvant FOLFOX and is doing well without any evidence of disease

## 2024-07-29 DIAGNOSIS — C18.9 COLON CANCER METASTASIZED TO INTRA-ABDOMINAL LYMPH NODE (HCC): ICD-10-CM

## 2024-07-29 DIAGNOSIS — C77.2 COLON CANCER METASTASIZED TO INTRA-ABDOMINAL LYMPH NODE (HCC): ICD-10-CM

## 2024-07-29 LAB — PROSTATE SPECIFIC ANTIGEN: 4.29 NG/ML (ref 0–4)

## 2024-12-17 ENCOUNTER — HOSPITAL ENCOUNTER (OUTPATIENT)
Dept: INFUSION THERAPY | Age: 69
Discharge: HOME | End: 2024-12-17
Payer: MEDICARE

## 2024-12-17 DIAGNOSIS — E88.810 METABOLIC SYNDROME: ICD-10-CM

## 2024-12-17 DIAGNOSIS — C18.9 COLON CANCER METASTASIZED TO INTRA-ABDOMINAL LYMPH NODE (HCC): ICD-10-CM

## 2024-12-17 DIAGNOSIS — C18.9 COLON CANCER METASTASIZED TO INTRA-ABDOMINAL LYMPH NODE (HCC): Primary | ICD-10-CM

## 2024-12-17 DIAGNOSIS — C77.2 COLON CANCER METASTASIZED TO INTRA-ABDOMINAL LYMPH NODE (HCC): Primary | ICD-10-CM

## 2024-12-17 DIAGNOSIS — C18.0 CARCINOMA OF CECUM (HCC): ICD-10-CM

## 2024-12-17 DIAGNOSIS — C77.2 COLON CANCER METASTASIZED TO INTRA-ABDOMINAL LYMPH NODE (HCC): ICD-10-CM

## 2024-12-17 DIAGNOSIS — E78.5 HYPERLIPIDEMIA, UNSPECIFIED HYPERLIPIDEMIA TYPE: ICD-10-CM

## 2024-12-17 LAB
ALBUMIN SERPL-MCNC: 4.5 G/DL (ref 3.5–5.2)
ALP SERPL-CCNC: 89 U/L (ref 40–129)
ALT SERPL-CCNC: 29 U/L (ref 0–40)
ANION GAP SERPL CALCULATED.3IONS-SCNC: 8 MMOL/L (ref 7–16)
AST SERPL-CCNC: 29 U/L (ref 0–39)
BASOPHILS # BLD: 0.04 K/UL (ref 0–0.2)
BASOPHILS NFR BLD: 1 % (ref 0–2)
BILIRUB SERPL-MCNC: 1.1 MG/DL (ref 0–1.2)
BUN SERPL-MCNC: 13 MG/DL (ref 6–23)
CALCIUM SERPL-MCNC: 9.5 MG/DL (ref 8.6–10.2)
CEA SERPL-MCNC: 3.9 NG/ML (ref 0–5.2)
CHLORIDE SERPL-SCNC: 102 MMOL/L (ref 98–107)
CHOLEST SERPL-MCNC: 217 MG/DL
CO2 SERPL-SCNC: 28 MMOL/L (ref 22–29)
CREAT SERPL-MCNC: 0.8 MG/DL (ref 0.7–1.2)
EOSINOPHIL # BLD: 0.27 K/UL (ref 0.05–0.5)
EOSINOPHILS RELATIVE PERCENT: 4 % (ref 0–6)
ERYTHROCYTE [DISTWIDTH] IN BLOOD BY AUTOMATED COUNT: 12.1 % (ref 11.5–15)
GFR, ESTIMATED: >90 ML/MIN/1.73M2
GLUCOSE SERPL-MCNC: 124 MG/DL (ref 74–99)
HBA1C MFR BLD: 5.6 % (ref 4–5.6)
HCT VFR BLD AUTO: 46.5 % (ref 37–54)
HDLC SERPL-MCNC: 46 MG/DL
HGB BLD-MCNC: 16.3 G/DL (ref 12.5–16.5)
IMM GRANULOCYTES # BLD AUTO: 0.03 K/UL (ref 0–0.58)
IMM GRANULOCYTES NFR BLD: 1 % (ref 0–5)
LDLC SERPL CALC-MCNC: 113 MG/DL
LYMPHOCYTES NFR BLD: 1.62 K/UL (ref 1.5–4)
LYMPHOCYTES RELATIVE PERCENT: 25 % (ref 20–42)
MCH RBC QN AUTO: 32.2 PG (ref 26–35)
MCHC RBC AUTO-ENTMCNC: 35.1 G/DL (ref 32–34.5)
MCV RBC AUTO: 91.9 FL (ref 80–99.9)
MONOCYTES NFR BLD: 0.64 K/UL (ref 0.1–0.95)
MONOCYTES NFR BLD: 10 % (ref 2–12)
NEUTROPHILS NFR BLD: 61 % (ref 43–80)
NEUTS SEG NFR BLD: 4.02 K/UL (ref 1.8–7.3)
PLATELET # BLD AUTO: 214 K/UL (ref 130–450)
PMV BLD AUTO: 9 FL (ref 7–12)
POTASSIUM SERPL-SCNC: 4.8 MMOL/L (ref 3.5–5)
PROT SERPL-MCNC: 7.3 G/DL (ref 6.4–8.3)
PSA SERPL-MCNC: 5.06 NG/ML (ref 0–4)
RBC # BLD AUTO: 5.06 M/UL (ref 3.8–5.8)
SODIUM SERPL-SCNC: 138 MMOL/L (ref 132–146)
TRIGL SERPL-MCNC: 292 MG/DL
VLDLC SERPL CALC-MCNC: 58 MG/DL
WBC OTHER # BLD: 6.6 K/UL (ref 4.5–11.5)

## 2024-12-17 PROCEDURE — 85025 COMPLETE CBC W/AUTO DIFF WBC: CPT

## 2024-12-17 PROCEDURE — 80061 LIPID PANEL: CPT

## 2024-12-17 PROCEDURE — 36415 COLL VENOUS BLD VENIPUNCTURE: CPT

## 2024-12-17 PROCEDURE — 80053 COMPREHEN METABOLIC PANEL: CPT

## 2024-12-17 PROCEDURE — 83036 HEMOGLOBIN GLYCOSYLATED A1C: CPT

## 2024-12-17 PROCEDURE — 82378 CARCINOEMBRYONIC ANTIGEN: CPT

## 2024-12-17 PROCEDURE — 84153 ASSAY OF PSA TOTAL: CPT

## 2024-12-18 ENCOUNTER — OFFICE VISIT (OUTPATIENT)
Dept: ONCOLOGY | Age: 69
End: 2024-12-18
Payer: MEDICARE

## 2024-12-18 VITALS
OXYGEN SATURATION: 97 % | TEMPERATURE: 98.1 F | BODY MASS INDEX: 26.82 KG/M2 | WEIGHT: 198 LBS | HEART RATE: 77 BPM | SYSTOLIC BLOOD PRESSURE: 137 MMHG | DIASTOLIC BLOOD PRESSURE: 79 MMHG | HEIGHT: 72 IN

## 2024-12-18 DIAGNOSIS — C77.2 COLON CANCER METASTASIZED TO INTRA-ABDOMINAL LYMPH NODE (HCC): Primary | ICD-10-CM

## 2024-12-18 DIAGNOSIS — C18.9 COLON CANCER METASTASIZED TO INTRA-ABDOMINAL LYMPH NODE (HCC): Primary | ICD-10-CM

## 2024-12-18 PROCEDURE — 99212 OFFICE O/P EST SF 10 MIN: CPT

## 2025-02-26 ENCOUNTER — HOSPITAL ENCOUNTER (OUTPATIENT)
Age: 70
Discharge: HOME | End: 2025-02-28

## 2025-03-03 LAB — SURGICAL PATHOLOGY REPORT: NORMAL

## 2025-06-16 DIAGNOSIS — E88.810 METABOLIC SYNDROME: ICD-10-CM

## 2025-06-16 DIAGNOSIS — E78.5 HYPERLIPIDEMIA, UNSPECIFIED HYPERLIPIDEMIA TYPE: ICD-10-CM

## 2025-06-16 DIAGNOSIS — C18.9 COLON CANCER METASTASIZED TO INTRA-ABDOMINAL LYMPH NODE (HCC): Primary | ICD-10-CM

## 2025-06-16 DIAGNOSIS — C77.2 COLON CANCER METASTASIZED TO INTRA-ABDOMINAL LYMPH NODE (HCC): Primary | ICD-10-CM

## 2025-06-17 ENCOUNTER — HOSPITAL ENCOUNTER (OUTPATIENT)
Dept: INFUSION THERAPY | Age: 70
End: 2025-06-17

## 2025-06-18 ENCOUNTER — HOSPITAL ENCOUNTER (OUTPATIENT)
Dept: INFUSION THERAPY | Age: 70
Discharge: HOME | End: 2025-06-18
Payer: MEDICARE

## 2025-06-18 ENCOUNTER — OFFICE VISIT (OUTPATIENT)
Dept: ONCOLOGY | Age: 70
End: 2025-06-18

## 2025-06-18 VITALS
OXYGEN SATURATION: 97 % | WEIGHT: 193 LBS | DIASTOLIC BLOOD PRESSURE: 67 MMHG | TEMPERATURE: 98.6 F | SYSTOLIC BLOOD PRESSURE: 136 MMHG | BODY MASS INDEX: 26.14 KG/M2 | HEIGHT: 72 IN | HEART RATE: 84 BPM

## 2025-06-18 DIAGNOSIS — E78.5 HYPERLIPIDEMIA, UNSPECIFIED HYPERLIPIDEMIA TYPE: ICD-10-CM

## 2025-06-18 DIAGNOSIS — C77.2 COLON CANCER METASTASIZED TO INTRA-ABDOMINAL LYMPH NODE (HCC): ICD-10-CM

## 2025-06-18 DIAGNOSIS — C77.2 COLON CANCER METASTASIZED TO INTRA-ABDOMINAL LYMPH NODE (HCC): Primary | ICD-10-CM

## 2025-06-18 DIAGNOSIS — E88.810 METABOLIC SYNDROME: ICD-10-CM

## 2025-06-18 DIAGNOSIS — C18.9 COLON CANCER METASTASIZED TO INTRA-ABDOMINAL LYMPH NODE (HCC): Primary | ICD-10-CM

## 2025-06-18 DIAGNOSIS — C18.9 COLON CANCER METASTASIZED TO INTRA-ABDOMINAL LYMPH NODE (HCC): ICD-10-CM

## 2025-06-18 LAB
ALBUMIN SERPL-MCNC: 4.1 G/DL (ref 3.5–5.2)
ALP SERPL-CCNC: 69 U/L (ref 40–129)
ALT SERPL-CCNC: 19 U/L (ref 0–50)
ANION GAP SERPL CALCULATED.3IONS-SCNC: 10 MMOL/L (ref 7–16)
AST SERPL-CCNC: 26 U/L (ref 0–50)
BASOPHILS # BLD: 0.03 K/UL (ref 0–0.2)
BASOPHILS NFR BLD: 1 % (ref 0–2)
BILIRUB SERPL-MCNC: 0.6 MG/DL (ref 0–1.2)
BUN SERPL-MCNC: 12 MG/DL (ref 8–23)
CALCIUM SERPL-MCNC: 9.3 MG/DL (ref 8.8–10.2)
CEA SERPL-MCNC: 3.7 NG/ML (ref 0–5.2)
CHLORIDE SERPL-SCNC: 102 MMOL/L (ref 98–107)
CHOLEST SERPL-MCNC: 222 MG/DL
CO2 SERPL-SCNC: 23 MMOL/L (ref 22–29)
CREAT SERPL-MCNC: 0.8 MG/DL (ref 0.7–1.2)
EOSINOPHIL # BLD: 0.19 K/UL (ref 0.05–0.5)
EOSINOPHILS RELATIVE PERCENT: 3 % (ref 0–6)
ERYTHROCYTE [DISTWIDTH] IN BLOOD BY AUTOMATED COUNT: 13.4 % (ref 11.5–15)
GFR, ESTIMATED: >90 ML/MIN/1.73M2
GLUCOSE SERPL-MCNC: 120 MG/DL (ref 74–99)
HBA1C MFR BLD: 6.1 % (ref 4–5.6)
HCT VFR BLD AUTO: 45.8 % (ref 37–54)
HDLC SERPL-MCNC: 40 MG/DL
HGB BLD-MCNC: 15.3 G/DL (ref 12.5–16.5)
IMM GRANULOCYTES # BLD AUTO: <0.03 K/UL (ref 0–0.58)
IMM GRANULOCYTES NFR BLD: 0 % (ref 0–5)
LDLC SERPL CALC-MCNC: 139 MG/DL
LYMPHOCYTES NFR BLD: 2.09 K/UL (ref 1.5–4)
LYMPHOCYTES RELATIVE PERCENT: 35 % (ref 20–42)
MCH RBC QN AUTO: 30.4 PG (ref 26–35)
MCHC RBC AUTO-ENTMCNC: 33.4 G/DL (ref 32–34.5)
MCV RBC AUTO: 90.9 FL (ref 80–99.9)
MONOCYTES NFR BLD: 0.6 K/UL (ref 0.1–0.95)
MONOCYTES NFR BLD: 10 % (ref 2–12)
NEUTROPHILS NFR BLD: 51 % (ref 43–80)
NEUTS SEG NFR BLD: 3.07 K/UL (ref 1.8–7.3)
PLATELET # BLD AUTO: 212 K/UL (ref 130–450)
PMV BLD AUTO: 9.2 FL (ref 7–12)
POTASSIUM SERPL-SCNC: 4 MMOL/L (ref 3.5–5.1)
PROT SERPL-MCNC: 6.7 G/DL (ref 6.4–8.3)
PSA SERPL-MCNC: 6.45 NG/ML (ref 0–4)
RBC # BLD AUTO: 5.04 M/UL (ref 3.8–5.8)
SODIUM SERPL-SCNC: 136 MMOL/L (ref 136–145)
TRIGL SERPL-MCNC: 214 MG/DL
VLDLC SERPL CALC-MCNC: 43 MG/DL
WBC OTHER # BLD: 6 K/UL (ref 4.5–11.5)

## 2025-06-18 PROCEDURE — 36415 COLL VENOUS BLD VENIPUNCTURE: CPT

## 2025-06-18 PROCEDURE — 85025 COMPLETE CBC W/AUTO DIFF WBC: CPT

## 2025-06-18 PROCEDURE — 82378 CARCINOEMBRYONIC ANTIGEN: CPT

## 2025-06-18 PROCEDURE — 83036 HEMOGLOBIN GLYCOSYLATED A1C: CPT

## 2025-06-18 PROCEDURE — 80053 COMPREHEN METABOLIC PANEL: CPT

## 2025-06-18 PROCEDURE — 80061 LIPID PANEL: CPT

## 2025-06-18 PROCEDURE — 84153 ASSAY OF PSA TOTAL: CPT

## 2025-06-18 RX ORDER — AMLODIPINE BESYLATE 2.5 MG/1
TABLET ORAL
COMMUNITY
Start: 2025-03-17

## 2025-06-18 NOTE — PROGRESS NOTES
[facility]  [address] [place] [address].   [place] [address]        Pt Name: Kunal Hameed  YOB: 1955  Date of evaluation: 6/18/2025  Primary Care Physician: Jose Swift MD  Reason for evaluation:   Chief Complaint   Patient presents with    Colon Cancer    Follow-up          Subjective: Here for  follow-up.     Feels well today.   Continues to work full-time.   No c/o's        OBJECTIVE:  VITALS:  height is 6' 4\" (1.93 m) and weight is 185 lb 8 oz (84.1 kg). His temporal temperature is 97.5 °F (36.4 °C). His blood pressure is 131/68 and his pulse is 77. His oxygen saturation is 96%.   Physical Exam:  Performance Status: 0  Well developed, well nourished male  EYES: sclera non-icteric   ENT: oropharynx clear.   NECK: No lymphadenopathy.   HEART: Regular rate and rhythm.  LUNGS: Clear .  ABDOMEN: Soft, nontender. No ascites. No mass or organomegaly.  EXTREMITIES: without clubbing, cyanosis, or edema.  NEUROLOGIC: No focal deficits.  SKIN: No Rash        Medications  Prior to Admission medications    Medication Sig Start Date End Date Taking? Authorizing Provider   amLODIPine (NORVASC) 2.5 MG tablet Take by mouth 3/17/25  Yes Jasmin Ibarra MD   metoprolol succinate (TOPROL XL) 25 MG extended release tablet  10/7/22  Yes Jasmin Ibarra MD   Timolol (TIMOPTIC) 0.5 % SOLN ophthalmic solution Apply 1 drop to eye daily Both eyes   Yes Jasmin Ibarra MD   coenzyme Q10 100 MG CAPS capsule Take 1 capsule by mouth  Patient not taking: Reported on 6/18/2025    Jasmin Ibarra MD   atorvastatin (LIPITOR) 20 MG tablet  10/7/22   Jasmin Ibarra MD   atorvastatin (LIPITOR) 10 MG tablet Take 1 tablet by mouth daily  Patient not taking: Reported on 6/18/2025    Jasmin Iabrra MD    Scheduled Meds:  Continuous Infusions:  PRN Meds:.        Recent Laboratory Data-     Lab Results   Component Value Date    WBC 6.0 06/18/2025    HGB 15.3 06/18/2025    HCT 45.8 06/18/2025    MCV

## 2025-07-21 DIAGNOSIS — E78.5 HYPERLIPIDEMIA, UNSPECIFIED HYPERLIPIDEMIA TYPE: Primary | ICD-10-CM

## 2025-07-21 DIAGNOSIS — E88.810 METABOLIC SYNDROME: ICD-10-CM

## 2025-07-21 DIAGNOSIS — C18.0 CARCINOMA OF CECUM (HCC): ICD-10-CM

## 2025-07-23 ENCOUNTER — OFFICE VISIT (OUTPATIENT)
Dept: ONCOLOGY | Age: 70
End: 2025-07-23

## 2025-07-23 ENCOUNTER — HOSPITAL ENCOUNTER (OUTPATIENT)
Dept: INFUSION THERAPY | Age: 70
Discharge: HOME | End: 2025-07-23
Payer: MEDICARE

## 2025-07-23 VITALS
HEART RATE: 73 BPM | SYSTOLIC BLOOD PRESSURE: 130 MMHG | OXYGEN SATURATION: 97 % | DIASTOLIC BLOOD PRESSURE: 86 MMHG | WEIGHT: 192 LBS | HEIGHT: 72 IN | TEMPERATURE: 97.7 F | BODY MASS INDEX: 26.01 KG/M2

## 2025-07-23 DIAGNOSIS — C77.2 COLON CANCER METASTASIZED TO INTRA-ABDOMINAL LYMPH NODE (HCC): Primary | ICD-10-CM

## 2025-07-23 DIAGNOSIS — C18.9 COLON CANCER METASTASIZED TO INTRA-ABDOMINAL LYMPH NODE (HCC): Primary | ICD-10-CM

## 2025-07-23 DIAGNOSIS — C77.2 COLON CANCER METASTASIZED TO INTRA-ABDOMINAL LYMPH NODE (HCC): ICD-10-CM

## 2025-07-23 DIAGNOSIS — E78.5 HYPERLIPIDEMIA, UNSPECIFIED HYPERLIPIDEMIA TYPE: ICD-10-CM

## 2025-07-23 DIAGNOSIS — C18.9 COLON CANCER METASTASIZED TO INTRA-ABDOMINAL LYMPH NODE (HCC): ICD-10-CM

## 2025-07-23 DIAGNOSIS — E88.810 METABOLIC SYNDROME: ICD-10-CM

## 2025-07-23 DIAGNOSIS — C61 PROSTATE CANCER (HCC): ICD-10-CM

## 2025-07-23 LAB
ALBUMIN SERPL-MCNC: 4 G/DL (ref 3.5–5.2)
ALP SERPL-CCNC: 64 U/L (ref 40–129)
ALT SERPL-CCNC: 18 U/L (ref 0–50)
ANION GAP SERPL CALCULATED.3IONS-SCNC: 11 MMOL/L (ref 7–16)
AST SERPL-CCNC: 22 U/L (ref 0–50)
BASOPHILS # BLD: 0.06 K/UL (ref 0–0.2)
BASOPHILS NFR BLD: 1 % (ref 0–2)
BILIRUB SERPL-MCNC: 1 MG/DL (ref 0–1.2)
BUN SERPL-MCNC: 8 MG/DL (ref 8–23)
CALCIUM SERPL-MCNC: 8.9 MG/DL (ref 8.8–10.2)
CEA SERPL-MCNC: 2.6 NG/ML (ref 0–5.2)
CHLORIDE SERPL-SCNC: 103 MMOL/L (ref 98–107)
CHOLEST SERPL-MCNC: 146 MG/DL
CO2 SERPL-SCNC: 24 MMOL/L (ref 22–29)
CREAT SERPL-MCNC: 0.7 MG/DL (ref 0.7–1.2)
EOSINOPHIL # BLD: 0.41 K/UL (ref 0.05–0.5)
EOSINOPHILS RELATIVE PERCENT: 8 % (ref 0–6)
ERYTHROCYTE [DISTWIDTH] IN BLOOD BY AUTOMATED COUNT: 13.6 % (ref 11.5–15)
GFR, ESTIMATED: >90 ML/MIN/1.73M2
GLUCOSE SERPL-MCNC: 104 MG/DL (ref 74–99)
HBA1C MFR BLD: 5.6 % (ref 4–5.6)
HCT VFR BLD AUTO: 44 % (ref 37–54)
HDLC SERPL-MCNC: 36 MG/DL
HGB BLD-MCNC: 14.8 G/DL (ref 12.5–16.5)
IMM GRANULOCYTES # BLD AUTO: <0.03 K/UL (ref 0–0.58)
IMM GRANULOCYTES NFR BLD: 0 % (ref 0–5)
LDLC SERPL CALC-MCNC: 71 MG/DL
LYMPHOCYTES NFR BLD: 2.09 K/UL (ref 1.5–4)
LYMPHOCYTES RELATIVE PERCENT: 38 % (ref 20–42)
MCH RBC QN AUTO: 31.2 PG (ref 26–35)
MCHC RBC AUTO-ENTMCNC: 33.6 G/DL (ref 32–34.5)
MCV RBC AUTO: 92.6 FL (ref 80–99.9)
MONOCYTES NFR BLD: 0.51 K/UL (ref 0.1–0.95)
MONOCYTES NFR BLD: 9 % (ref 2–12)
NEUTROPHILS NFR BLD: 44 % (ref 43–80)
NEUTS SEG NFR BLD: 2.39 K/UL (ref 1.8–7.3)
PLATELET # BLD AUTO: 185 K/UL (ref 130–450)
PMV BLD AUTO: 9.6 FL (ref 7–12)
POTASSIUM SERPL-SCNC: 4.1 MMOL/L (ref 3.5–5.1)
PROT SERPL-MCNC: 7 G/DL (ref 6.4–8.3)
RBC # BLD AUTO: 4.75 M/UL (ref 3.8–5.8)
SODIUM SERPL-SCNC: 138 MMOL/L (ref 136–145)
TRIGL SERPL-MCNC: 197 MG/DL
VLDLC SERPL CALC-MCNC: 39 MG/DL
WBC OTHER # BLD: 5.5 K/UL (ref 4.5–11.5)

## 2025-07-23 PROCEDURE — 80053 COMPREHEN METABOLIC PANEL: CPT

## 2025-07-23 PROCEDURE — 85025 COMPLETE CBC W/AUTO DIFF WBC: CPT

## 2025-07-23 PROCEDURE — 83036 HEMOGLOBIN GLYCOSYLATED A1C: CPT

## 2025-07-23 PROCEDURE — 36415 COLL VENOUS BLD VENIPUNCTURE: CPT

## 2025-07-23 PROCEDURE — 82378 CARCINOEMBRYONIC ANTIGEN: CPT

## 2025-07-23 PROCEDURE — 80061 LIPID PANEL: CPT

## 2025-07-23 NOTE — PROGRESS NOTES
[facility]  [address] [place] [address].   [place] [address]        Pt Name: Kunal Hameed  YOB: 1955  Date of evaluation: 7/23/2025  Primary Care Physician: Jose Swift MD  Reason for evaluation:   No chief complaint on file.         Subjective: Here for  follow-up.     Feels well today.   Continues to work full-time.   No c/o's        OBJECTIVE:  VITALS:  height is 6' 4\" (1.93 m) and weight is 185 lb 8 oz (84.1 kg). His temporal temperature is 97.5 °F (36.4 °C). His blood pressure is 131/68 and his pulse is 77. His oxygen saturation is 96%.   Physical Exam:  Performance Status: 0  Well developed, well nourished male  EYES: sclera non-icteric   ENT: oropharynx clear.   NECK: No lymphadenopathy.   HEART: Regular rate and rhythm.  LUNGS: Clear .  ABDOMEN: Soft, nontender. No ascites. No mass or organomegaly.  EXTREMITIES: without clubbing, cyanosis, or edema.  NEUROLOGIC: No focal deficits.  SKIN: No Rash        Medications  Prior to Admission medications    Medication Sig Start Date End Date Taking? Authorizing Provider   amLODIPine (NORVASC) 2.5 MG tablet Take by mouth 3/17/25   Jasmin Ibarra MD   coenzyme Q10 100 MG CAPS capsule Take 1 capsule by mouth  Patient not taking: Reported on 6/18/2025    Jasmin Ibarra MD   atorvastatin (LIPITOR) 20 MG tablet  10/7/22   Jasmin Ibarra MD   metoprolol succinate (TOPROL XL) 25 MG extended release tablet  10/7/22   Jasmin Ibarra MD   atorvastatin (LIPITOR) 10 MG tablet Take 1 tablet by mouth daily  Patient not taking: Reported on 6/18/2025    Jasmin Ibarra MD   Timolol (TIMOPTIC) 0.5 % SOLN ophthalmic solution Apply 1 drop to eye daily Both eyes    Jasmin Ibarra MD    Scheduled Meds:  Continuous Infusions:  PRN Meds:.        Recent Laboratory Data-     Lab Results   Component Value Date    WBC 5.5 07/23/2025    HGB 14.8 07/23/2025    HCT 44.0 07/23/2025    MCV 92.6 07/23/2025     07/23/2025    LYMPHOPCT

## (undated) DEVICE — SPONGE GZ 4IN 4IN 4 PLY N WVN AVANT

## (undated) DEVICE — LUBRICANT SURG JELLY ST BACTER TUBE 4.25OZ

## (undated) DEVICE — MASK,FACE,MAXFLUIDPROTECT,SHIELD/ERLPS: Brand: MEDLINE

## (undated) DEVICE — Device: Brand: DEFENDO VALVE AND CONNECTOR KIT

## (undated) DEVICE — TUBING, SUCTION, 1/4" X 10', STRAIGHT: Brand: MEDLINE

## (undated) DEVICE — 6 X 9  1.75MIL 4-WALL LABGUARD: Brand: MINIGRIP COMMERCIAL LLC

## (undated) DEVICE — GOWN ISOLATN REG YEL M WT MULTIPLY SIDETIE LEV 2

## (undated) DEVICE — FORCEPS BX L240CM JAW DIA2.8MM L CAP W/ NDL MIC MESH TOOTH

## (undated) DEVICE — KIT BEDSIDE REVITAL OX 500ML

## (undated) DEVICE — CONTAINER SPEC COLL 960ML POLYPR TRIANG GRAD INTAKE/OUTPUT

## (undated) DEVICE — KENDALL 450 SERIES MONITORING FOAM ELECTRODE - RECTANGULAR SHAPE ( 3/PK): Brand: KENDALL